# Patient Record
Sex: MALE | Race: WHITE | NOT HISPANIC OR LATINO | URBAN - METROPOLITAN AREA
[De-identification: names, ages, dates, MRNs, and addresses within clinical notes are randomized per-mention and may not be internally consistent; named-entity substitution may affect disease eponyms.]

---

## 2022-06-17 ENCOUNTER — INPATIENT (INPATIENT)
Facility: HOSPITAL | Age: 52
LOS: 2 days | Discharge: HOME | DRG: 247 | End: 2022-06-20
Attending: INTERNAL MEDICINE | Admitting: INTERNAL MEDICINE
Payer: COMMERCIAL

## 2022-06-17 ENCOUNTER — EMERGENCY (EMERGENCY)
Facility: HOSPITAL | Age: 52
LOS: 1 days | End: 2022-06-17
Attending: EMERGENCY MEDICINE
Payer: COMMERCIAL

## 2022-06-17 VITALS
DIASTOLIC BLOOD PRESSURE: 79 MMHG | OXYGEN SATURATION: 100 % | SYSTOLIC BLOOD PRESSURE: 121 MMHG | TEMPERATURE: 98 F | RESPIRATION RATE: 25 BRPM | HEART RATE: 74 BPM

## 2022-06-17 VITALS
SYSTOLIC BLOOD PRESSURE: 102 MMHG | WEIGHT: 184.09 LBS | TEMPERATURE: 98 F | DIASTOLIC BLOOD PRESSURE: 62 MMHG | RESPIRATION RATE: 17 BRPM | OXYGEN SATURATION: 96 % | HEART RATE: 72 BPM | HEIGHT: 68 IN

## 2022-06-17 VITALS
RESPIRATION RATE: 18 BRPM | OXYGEN SATURATION: 97 % | HEIGHT: 68 IN | HEART RATE: 87 BPM | WEIGHT: 190.04 LBS | SYSTOLIC BLOOD PRESSURE: 153 MMHG | DIASTOLIC BLOOD PRESSURE: 78 MMHG

## 2022-06-17 DIAGNOSIS — Z98.890 OTHER SPECIFIED POSTPROCEDURAL STATES: Chronic | ICD-10-CM

## 2022-06-17 DIAGNOSIS — I21.3 ST ELEVATION (STEMI) MYOCARDIAL INFARCTION OF UNSPECIFIED SITE: ICD-10-CM

## 2022-06-17 LAB
ALBUMIN SERPL ELPH-MCNC: 3.9 G/DL — SIGNIFICANT CHANGE UP (ref 3.3–5)
ALP SERPL-CCNC: 59 U/L — SIGNIFICANT CHANGE UP (ref 40–120)
ALT FLD-CCNC: 32 U/L — SIGNIFICANT CHANGE UP (ref 12–78)
ANION GAP SERPL CALC-SCNC: 9 MMOL/L — SIGNIFICANT CHANGE UP (ref 5–17)
APTT BLD: 23.6 SEC — LOW (ref 27.5–35.5)
AST SERPL-CCNC: 23 U/L — SIGNIFICANT CHANGE UP (ref 15–37)
BASOPHILS # BLD AUTO: 0.08 K/UL — SIGNIFICANT CHANGE UP (ref 0–0.2)
BASOPHILS NFR BLD AUTO: 1.2 % — SIGNIFICANT CHANGE UP (ref 0–2)
BILIRUB SERPL-MCNC: 0.4 MG/DL — SIGNIFICANT CHANGE UP (ref 0.2–1.2)
BUN SERPL-MCNC: 13 MG/DL — SIGNIFICANT CHANGE UP (ref 7–23)
CALCIUM SERPL-MCNC: 9.3 MG/DL — SIGNIFICANT CHANGE UP (ref 8.5–10.1)
CHLORIDE SERPL-SCNC: 106 MMOL/L — SIGNIFICANT CHANGE UP (ref 96–108)
CK MB BLD-MCNC: 6 % — HIGH (ref 0–3.5)
CK MB CFR SERPL CALC: 130.2 NG/ML — HIGH (ref 0–6.7)
CK SERPL-CCNC: 2174 U/L — HIGH (ref 30–200)
CO2 SERPL-SCNC: 25 MMOL/L — SIGNIFICANT CHANGE UP (ref 22–31)
CREAT SERPL-MCNC: 1.3 MG/DL — SIGNIFICANT CHANGE UP (ref 0.5–1.3)
EGFR: 65 ML/MIN/1.73M2 — SIGNIFICANT CHANGE UP
EOSINOPHIL # BLD AUTO: 0.22 K/UL — SIGNIFICANT CHANGE UP (ref 0–0.5)
EOSINOPHIL NFR BLD AUTO: 3.3 % — SIGNIFICANT CHANGE UP (ref 0–6)
GLUCOSE BLDC GLUCOMTR-MCNC: 130 MG/DL — HIGH (ref 70–99)
GLUCOSE BLDC GLUCOMTR-MCNC: 151 MG/DL — HIGH (ref 70–99)
GLUCOSE BLDC GLUCOMTR-MCNC: 223 MG/DL — HIGH (ref 70–99)
GLUCOSE SERPL-MCNC: 227 MG/DL — HIGH (ref 70–99)
HCT VFR BLD CALC: 40.7 % — SIGNIFICANT CHANGE UP (ref 39–50)
HGB BLD-MCNC: 13.8 G/DL — SIGNIFICANT CHANGE UP (ref 13–17)
IMM GRANULOCYTES NFR BLD AUTO: 0.3 % — SIGNIFICANT CHANGE UP (ref 0–1.5)
LYMPHOCYTES # BLD AUTO: 2.39 K/UL — SIGNIFICANT CHANGE UP (ref 1–3.3)
LYMPHOCYTES # BLD AUTO: 35.6 % — SIGNIFICANT CHANGE UP (ref 13–44)
MCHC RBC-ENTMCNC: 28 PG — SIGNIFICANT CHANGE UP (ref 27–34)
MCHC RBC-ENTMCNC: 33.9 GM/DL — SIGNIFICANT CHANGE UP (ref 32–36)
MCV RBC AUTO: 82.7 FL — SIGNIFICANT CHANGE UP (ref 80–100)
MONOCYTES # BLD AUTO: 0.75 K/UL — SIGNIFICANT CHANGE UP (ref 0–0.9)
MONOCYTES NFR BLD AUTO: 11.2 % — SIGNIFICANT CHANGE UP (ref 2–14)
NEUTROPHILS # BLD AUTO: 3.26 K/UL — SIGNIFICANT CHANGE UP (ref 1.8–7.4)
NEUTROPHILS NFR BLD AUTO: 48.4 % — SIGNIFICANT CHANGE UP (ref 43–77)
NRBC # BLD: 0 /100 WBCS — SIGNIFICANT CHANGE UP (ref 0–0)
NT-PROBNP SERPL-SCNC: 22 PG/ML — SIGNIFICANT CHANGE UP (ref 0–125)
PLATELET # BLD AUTO: 262 K/UL — SIGNIFICANT CHANGE UP (ref 150–400)
POTASSIUM SERPL-MCNC: 4 MMOL/L — SIGNIFICANT CHANGE UP (ref 3.5–5.3)
POTASSIUM SERPL-SCNC: 4 MMOL/L — SIGNIFICANT CHANGE UP (ref 3.5–5.3)
PROT SERPL-MCNC: 7.2 G/DL — SIGNIFICANT CHANGE UP (ref 6–8.3)
RBC # BLD: 4.92 M/UL — SIGNIFICANT CHANGE UP (ref 4.2–5.8)
RBC # FLD: 12.4 % — SIGNIFICANT CHANGE UP (ref 10.3–14.5)
SARS-COV-2 RNA SPEC QL NAA+PROBE: SIGNIFICANT CHANGE UP
SODIUM SERPL-SCNC: 140 MMOL/L — SIGNIFICANT CHANGE UP (ref 135–145)
TROPONIN I, HIGH SENSITIVITY RESULT: 12.6 NG/L — SIGNIFICANT CHANGE UP
TROPONIN T, HIGH SENSITIVITY RESULT: 2179 NG/L — HIGH (ref 0–51)
WBC # BLD: 6.72 K/UL — SIGNIFICANT CHANGE UP (ref 3.8–10.5)
WBC # FLD AUTO: 6.72 K/UL — SIGNIFICANT CHANGE UP (ref 3.8–10.5)

## 2022-06-17 PROCEDURE — 93010 ELECTROCARDIOGRAM REPORT: CPT | Mod: 76

## 2022-06-17 PROCEDURE — 85025 COMPLETE CBC W/AUTO DIFF WBC: CPT

## 2022-06-17 PROCEDURE — 99285 EMERGENCY DEPT VISIT HI MDM: CPT | Mod: 25

## 2022-06-17 PROCEDURE — 85730 THROMBOPLASTIN TIME PARTIAL: CPT

## 2022-06-17 PROCEDURE — 93005 ELECTROCARDIOGRAM TRACING: CPT

## 2022-06-17 PROCEDURE — 96375 TX/PRO/DX INJ NEW DRUG ADDON: CPT

## 2022-06-17 PROCEDURE — 99291 CRITICAL CARE FIRST HOUR: CPT

## 2022-06-17 PROCEDURE — 36415 COLL VENOUS BLD VENIPUNCTURE: CPT

## 2022-06-17 PROCEDURE — 99223 1ST HOSP IP/OBS HIGH 75: CPT

## 2022-06-17 PROCEDURE — 93010 ELECTROCARDIOGRAM REPORT: CPT

## 2022-06-17 PROCEDURE — 99292 CRITICAL CARE ADDL 30 MIN: CPT

## 2022-06-17 PROCEDURE — 83880 ASSAY OF NATRIURETIC PEPTIDE: CPT

## 2022-06-17 PROCEDURE — 82962 GLUCOSE BLOOD TEST: CPT

## 2022-06-17 PROCEDURE — U0003: CPT

## 2022-06-17 PROCEDURE — U0005: CPT

## 2022-06-17 PROCEDURE — 96374 THER/PROPH/DIAG INJ IV PUSH: CPT

## 2022-06-17 PROCEDURE — 84484 ASSAY OF TROPONIN QUANT: CPT

## 2022-06-17 PROCEDURE — 80053 COMPREHEN METABOLIC PANEL: CPT

## 2022-06-17 RX ORDER — ONDANSETRON 8 MG/1
4 TABLET, FILM COATED ORAL ONCE
Refills: 0 | Status: COMPLETED | OUTPATIENT
Start: 2022-06-17 | End: 2022-06-17

## 2022-06-17 RX ORDER — SODIUM CHLORIDE 9 MG/ML
1000 INJECTION, SOLUTION INTRAVENOUS
Refills: 0 | Status: DISCONTINUED | OUTPATIENT
Start: 2022-06-17 | End: 2022-06-19

## 2022-06-17 RX ORDER — DEXTROSE 50 % IN WATER 50 %
25 SYRINGE (ML) INTRAVENOUS ONCE
Refills: 0 | Status: DISCONTINUED | OUTPATIENT
Start: 2022-06-17 | End: 2022-06-19

## 2022-06-17 RX ORDER — DEXTROSE 50 % IN WATER 50 %
15 SYRINGE (ML) INTRAVENOUS ONCE
Refills: 0 | Status: DISCONTINUED | OUTPATIENT
Start: 2022-06-17 | End: 2022-06-19

## 2022-06-17 RX ORDER — HEPARIN SODIUM 5000 [USP'U]/ML
INJECTION INTRAVENOUS; SUBCUTANEOUS
Qty: 25000 | Refills: 0 | Status: DISCONTINUED | OUTPATIENT
Start: 2022-06-17 | End: 2022-06-21

## 2022-06-17 RX ORDER — GLUCAGON INJECTION, SOLUTION 0.5 MG/.1ML
1 INJECTION, SOLUTION SUBCUTANEOUS ONCE
Refills: 0 | Status: DISCONTINUED | OUTPATIENT
Start: 2022-06-17 | End: 2022-06-19

## 2022-06-17 RX ORDER — INSULIN LISPRO 100/ML
6 VIAL (ML) SUBCUTANEOUS
Refills: 0 | Status: DISCONTINUED | OUTPATIENT
Start: 2022-06-17 | End: 2022-06-18

## 2022-06-17 RX ORDER — MORPHINE SULFATE 50 MG/1
2 CAPSULE, EXTENDED RELEASE ORAL ONCE
Refills: 0 | Status: DISCONTINUED | OUTPATIENT
Start: 2022-06-17 | End: 2022-06-17

## 2022-06-17 RX ORDER — CLOPIDOGREL BISULFATE 75 MG/1
600 TABLET, FILM COATED ORAL ONCE
Refills: 0 | Status: COMPLETED | OUTPATIENT
Start: 2022-06-17 | End: 2022-06-17

## 2022-06-17 RX ORDER — INSULIN NPH HUM/REG INSULIN HM 70-30/ML
20 VIAL (ML) SUBCUTANEOUS
Qty: 0 | Refills: 0 | DISCHARGE

## 2022-06-17 RX ORDER — HUMAN INSULIN 100 [IU]/ML
20 INJECTION, SUSPENSION SUBCUTANEOUS AT BEDTIME
Refills: 0 | Status: DISCONTINUED | OUTPATIENT
Start: 2022-06-17 | End: 2022-06-19

## 2022-06-17 RX ORDER — DEXTROSE 50 % IN WATER 50 %
12.5 SYRINGE (ML) INTRAVENOUS ONCE
Refills: 0 | Status: DISCONTINUED | OUTPATIENT
Start: 2022-06-17 | End: 2022-06-19

## 2022-06-17 RX ORDER — INSULIN LISPRO 100/ML
VIAL (ML) SUBCUTANEOUS AT BEDTIME
Refills: 0 | Status: DISCONTINUED | OUTPATIENT
Start: 2022-06-17 | End: 2022-06-20

## 2022-06-17 RX ORDER — INSULIN LISPRO 100/ML
VIAL (ML) SUBCUTANEOUS
Refills: 0 | Status: DISCONTINUED | OUTPATIENT
Start: 2022-06-17 | End: 2022-06-20

## 2022-06-17 RX ORDER — TICAGRELOR 90 MG/1
180 TABLET ORAL ONCE
Refills: 0 | Status: COMPLETED | OUTPATIENT
Start: 2022-06-17 | End: 2022-06-17

## 2022-06-17 RX ORDER — HUMAN INSULIN 100 [IU]/ML
27 INJECTION, SUSPENSION SUBCUTANEOUS
Refills: 0 | Status: DISCONTINUED | OUTPATIENT
Start: 2022-06-18 | End: 2022-06-19

## 2022-06-17 RX ORDER — HEPARIN SODIUM 5000 [USP'U]/ML
5000 INJECTION INTRAVENOUS; SUBCUTANEOUS EVERY 8 HOURS
Refills: 0 | Status: DISCONTINUED | OUTPATIENT
Start: 2022-06-18 | End: 2022-06-20

## 2022-06-17 RX ORDER — ASPIRIN/CALCIUM CARB/MAGNESIUM 324 MG
81 TABLET ORAL DAILY
Refills: 0 | Status: DISCONTINUED | OUTPATIENT
Start: 2022-06-18 | End: 2022-06-20

## 2022-06-17 RX ORDER — INSULIN NPH HUM/REG INSULIN HM 70-30/ML
27 VIAL (ML) SUBCUTANEOUS
Qty: 0 | Refills: 0 | DISCHARGE

## 2022-06-17 RX ORDER — ATORVASTATIN CALCIUM 80 MG/1
80 TABLET, FILM COATED ORAL AT BEDTIME
Refills: 0 | Status: DISCONTINUED | OUTPATIENT
Start: 2022-06-17 | End: 2022-06-20

## 2022-06-17 RX ORDER — TICAGRELOR 90 MG/1
90 TABLET ORAL EVERY 12 HOURS
Refills: 0 | Status: DISCONTINUED | OUTPATIENT
Start: 2022-06-18 | End: 2022-06-20

## 2022-06-17 RX ORDER — ASPIRIN/CALCIUM CARB/MAGNESIUM 324 MG
1 TABLET ORAL
Qty: 0 | Refills: 0 | DISCHARGE

## 2022-06-17 RX ORDER — INSULIN LISPRO 100/ML
8 VIAL (ML) SUBCUTANEOUS
Qty: 0 | Refills: 0 | DISCHARGE

## 2022-06-17 RX ORDER — HEPARIN SODIUM 5000 [USP'U]/ML
4000 INJECTION INTRAVENOUS; SUBCUTANEOUS ONCE
Refills: 0 | Status: COMPLETED | OUTPATIENT
Start: 2022-06-17 | End: 2022-06-17

## 2022-06-17 RX ORDER — INFLUENZA VIRUS VACCINE 15; 15; 15; 15 UG/.5ML; UG/.5ML; UG/.5ML; UG/.5ML
0.5 SUSPENSION INTRAMUSCULAR ONCE
Refills: 0 | Status: COMPLETED | OUTPATIENT
Start: 2022-06-17 | End: 2022-06-17

## 2022-06-17 RX ORDER — HEPARIN SODIUM 5000 [USP'U]/ML
4000 INJECTION INTRAVENOUS; SUBCUTANEOUS EVERY 6 HOURS
Refills: 0 | Status: DISCONTINUED | OUTPATIENT
Start: 2022-06-17 | End: 2022-06-21

## 2022-06-17 RX ORDER — CHLORHEXIDINE GLUCONATE 213 G/1000ML
1 SOLUTION TOPICAL
Refills: 0 | Status: DISCONTINUED | OUTPATIENT
Start: 2022-06-17 | End: 2022-06-20

## 2022-06-17 RX ORDER — INSULIN LISPRO 100/ML
VIAL (ML) SUBCUTANEOUS
Refills: 0 | Status: DISCONTINUED | OUTPATIENT
Start: 2022-06-17 | End: 2022-06-17

## 2022-06-17 RX ADMIN — CLOPIDOGREL BISULFATE 600 MILLIGRAM(S): 75 TABLET, FILM COATED ORAL at 14:37

## 2022-06-17 RX ADMIN — ATORVASTATIN CALCIUM 80 MILLIGRAM(S): 80 TABLET, FILM COATED ORAL at 22:03

## 2022-06-17 RX ADMIN — Medication 2: at 20:21

## 2022-06-17 RX ADMIN — HUMAN INSULIN 20 UNIT(S): 100 INJECTION, SUSPENSION SUBCUTANEOUS at 22:03

## 2022-06-17 RX ADMIN — MORPHINE SULFATE 2 MILLIGRAM(S): 50 CAPSULE, EXTENDED RELEASE ORAL at 14:20

## 2022-06-17 RX ADMIN — CHLORHEXIDINE GLUCONATE 1 APPLICATION(S): 213 SOLUTION TOPICAL at 22:37

## 2022-06-17 RX ADMIN — ONDANSETRON 4 MILLIGRAM(S): 8 TABLET, FILM COATED ORAL at 18:15

## 2022-06-17 RX ADMIN — MORPHINE SULFATE 2 MILLIGRAM(S): 50 CAPSULE, EXTENDED RELEASE ORAL at 14:44

## 2022-06-17 RX ADMIN — TICAGRELOR 180 MILLIGRAM(S): 90 TABLET ORAL at 15:10

## 2022-06-17 RX ADMIN — HEPARIN SODIUM 1000 UNIT(S)/HR: 5000 INJECTION INTRAVENOUS; SUBCUTANEOUS at 14:45

## 2022-06-17 RX ADMIN — Medication 6 UNIT(S): at 20:20

## 2022-06-17 RX ADMIN — HEPARIN SODIUM 4000 UNIT(S): 5000 INJECTION INTRAVENOUS; SUBCUTANEOUS at 14:30

## 2022-06-17 RX ADMIN — ONDANSETRON 4 MILLIGRAM(S): 8 TABLET, FILM COATED ORAL at 14:45

## 2022-06-17 NOTE — H&P ADULT - HISTORY OF PRESENT ILLNESS
52 year old man with diabetes, family history of CAD and CHF, presents on 6/17 from work with severe chest pain in the center of his chest, associated with nausea. 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 Patient admits to heavier than usual drinking last night, does not drink daily, according to wife. He was a previous smoker.    EMS found patient with a junctional bradycardia, HR in the 30s, atropine given.  given. Patient brought to [facility].    In [place] ED, patient EKG significant for ST elevations in III, AVF, lead II slight elevation; reciprocal changes in V4-V6, HR normalized to 70s.  Patient given 325 aspirin and 600 Plavix. Patient subsequently received Brilinta 180 as well as heparin bolus. Patient transferred ot [facility] for PCI.    At [facility], patient received 2 stents in RCA. During procedure, patient with ectopic AVIR, received lidocaine 50x2. EF 50% noted during procedure.      52 year old man with type 1 DM on Humulin BID/Humalog pre-meal, HLD on simvastatin, presented to OSH on 6/17 with severe chest pain in the center of his chest, associated with nausea. 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 Patient admits to heavier than usual drinking last night, does not drink daily, according to wife. He was a previous smoker, quit 6 months ago, smoked for >20 years prior.     EMS found patient with a junctional bradycardia, HR in the 30s, atropine given.  given. Patient brought to [facility].    In [place] ED, patient EKG significant for ST elevations in III, AVF, lead II slight elevation; reciprocal changes in V4-V6, HR normalized to 70s.  Patient given 325 aspirin and 600 Plavix. Patient subsequently received Brilinta 180 as well as heparin bolus. Patient transferred ot [facility] for PCI.    At [facility], patient received 2 stents in RCA for 100% stenosis. During procedure, patient with ectopic AVIR, received lidocaine 50x2. EF 50% noted during procedure.

## 2022-06-17 NOTE — PROGRESS NOTE ADULT - SUBJECTIVE AND OBJECTIVE BOX
Admission date:  CHIEF COMPLAINT:  HPI:  52 year old man with type 1 DM on Humulin BID/Humalog pre-meal, HLD on simvastatin, presented to OSH on  with severe chest pain in the center of his chest, associated with nausea. 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 Patient admits to heavier than usual drinking last night, does not drink daily, according to wife. He was a previous smoker, quit 6 months ago, smoked for >20 years prior.     EMS found patient with a junctional bradycardia, HR in the 30s, atropine given.  given. Patient brought to [facility].    In [place] ED, patient EKG significant for ST elevations in III, AVF, lead II slight elevation; reciprocal changes in V4-V6, HR normalized to 70s.  Patient given 325 aspirin and 600 Plavix. Patient subsequently received Brilinta 180 as well as heparin bolus. Patient transferred ot [facility] for PCI.    At [facility], patient received 2 stents in RCA for 100% stenosis. During procedure, patient with ectopic AVIR, received lidocaine 50x2. EF 50% noted during procedure.      (2022 16:21)    INTERVAL HISTORY:    REVIEW OF SYSTEMS: Denies xxxxxx; all others negative    MEDICATIONS  (STANDING):  atorvastatin 80 milliGRAM(s) Oral at bedtime  chlorhexidine 4% Liquid 1 Application(s) Topical <User Schedule>  dextrose 5%. 1000 milliLiter(s) (50 mL/Hr) IV Continuous <Continuous>  dextrose 5%. 1000 milliLiter(s) (100 mL/Hr) IV Continuous <Continuous>  dextrose 50% Injectable 25 Gram(s) IV Push once  dextrose 50% Injectable 12.5 Gram(s) IV Push once  dextrose 50% Injectable 25 Gram(s) IV Push once  glucagon  Injectable 1 milliGRAM(s) IntraMuscular once  influenza   Vaccine 0.5 milliLiter(s) IntraMuscular once  insulin lispro (ADMELOG) corrective regimen sliding scale   SubCutaneous at bedtime  insulin lispro (ADMELOG) corrective regimen sliding scale   SubCutaneous three times a day before meals  insulin lispro Injectable (ADMELOG) 6 Unit(s) SubCutaneous three times a day before meals  insulin NPH human recombinant 20 Unit(s) SubCutaneous at bedtime    MEDICATIONS  (PRN):  dextrose Oral Gel 15 Gram(s) Oral once PRN Blood Glucose LESS THAN 70 milliGRAM(s)/deciliter      Objective:  ICU Vital Signs Last 24 Hrs  T(C): 36.7 (2022 20:00), Max: 36.8 (2022 14:45)  T(F): 98 (2022 20:00), Max: 98.2 (2022 14:45)  HR: 62 (2022 21:00) (58 - 87)  BP: 109/60 (2022 21:00) (93/56 - 153/78)  BP(mean): 79 (2022 21:00) (68 - 94)  ABP: --  ABP(mean): --  RR: 14 (2022 21:00) (10 - 38)  SpO2: 95% (2022 21:00) (94% - 100%)      Adult Advanced Hemodynamics Last 24 Hrs  CVP(mm Hg): --  CVP(cm H2O): --  CO: --  CI: --  PA: --  PA(mean): --  PCWP: --  SVR: --  SVRI: --  PVR: --  PVRI: --       @ 07:01  -   @ 21:12  --------------------------------------------------------  IN: 0 mL / OUT: 100 mL / NET: -100 mL      Daily Height in cm: 172.72 (2022 16:27)    Daily Weight in k.5 (2022 16:27)    PHYSICAL EXAM:      Constitutional:    HEENT:    Respiratory:    Cardiovascular:  Access site:    Gastrointestinal:    Genitourinary:    Extremities:    Vascular:    Neurological:    Skin:      TELEMETRY:     EKG:     IMAGING:    Labs:                          13.8   6.72  )-----------( 262      ( 2022 14:36 )             40.7     06-17    140  |  106  |  13  ----------------------------<  227<H>  4.0   |  25  |  1.30    Ca    9.3      2022 14:36    TPro  7.2  /  Alb  3.9  /  TBili  0.4  /  DBili  x   /  AST  23  /  ALT  32  /  AlkPhos  59  06-17    LIVER FUNCTIONS - ( 2022 14:36 )  Alb: 3.9 g/dL / Pro: 7.2 g/dL / ALK PHOS: 59 U/L / ALT: 32 U/L / AST: 23 U/L / GGT: x           PTT - ( 2022 14:37 )  PTT:23.6 sec        HEALTH ISSUES - PROBLEM Dx:            I have personally provided ______ minutes of critical care time excluding time spent on separate procedures, in addition to initial critical care time provided by the CICU Attending, _____.    HPI:  52 year old man with type 1 DM on Humulin BID/Humalog pre-meal, HLD on simvastatin, presented to OSH on  with severe chest pain in the center of his chest, associated with nausea. 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 Patient admits to heavier than usual drinking last night, does not drink daily, according to wife. He was a previous smoker, quit 6 months ago, smoked for >20 years prior.     EMS found patient with a junctional bradycardia, HR in the 30s, atropine given.  given. Patient brought to [facility].    In [place] ED, patient EKG significant for ST elevations in III, AVF, lead II slight elevation; reciprocal changes in V4-V6, HR normalized to 70s.  Patient given 325 aspirin and 600 Plavix. Patient subsequently received Brilinta 180 as well as heparin bolus. Patient transferred ot [facility] for PCI.    At [facility], patient received 2 stents in RCA for 100% stenosis. During procedure, patient with ectopic AVIR, received lidocaine 50x2. EF 50% noted during procedure.      (2022 16:21)    INTERVAL HISTORY:  - IWSTEMI s/p C % s/p EVERETT x2    MEDICATIONS  (STANDING):  atorvastatin 80 milliGRAM(s) Oral at bedtime  chlorhexidine 4% Liquid 1 Application(s) Topical <User Schedule>  dextrose 5%. 1000 milliLiter(s) (50 mL/Hr) IV Continuous <Continuous>  dextrose 5%. 1000 milliLiter(s) (100 mL/Hr) IV Continuous <Continuous>  dextrose 50% Injectable 25 Gram(s) IV Push once  dextrose 50% Injectable 12.5 Gram(s) IV Push once  dextrose 50% Injectable 25 Gram(s) IV Push once  glucagon  Injectable 1 milliGRAM(s) IntraMuscular once  influenza   Vaccine 0.5 milliLiter(s) IntraMuscular once  insulin lispro (ADMELOG) corrective regimen sliding scale   SubCutaneous at bedtime  insulin lispro (ADMELOG) corrective regimen sliding scale   SubCutaneous three times a day before meals  insulin lispro Injectable (ADMELOG) 6 Unit(s) SubCutaneous three times a day before meals  insulin NPH human recombinant 20 Unit(s) SubCutaneous at bedtime    MEDICATIONS  (PRN):  dextrose Oral Gel 15 Gram(s) Oral once PRN Blood Glucose LESS THAN 70 milliGRAM(s)/deciliter      Objective:  ICU Vital Signs Last 24 Hrs  T(C): 36.7 (2022 20:00), Max: 36.8 (2022 14:45)  T(F): 98 (2022 20:00), Max: 98.2 (2022 14:45)  HR: 62 (2022 21:00) (58 - 87)  BP: 109/60 (2022 21:00) (93/56 - 153/78)  BP(mean): 79 (2022 21:00) (68 - 94)  RR: 14 (2022 21:00) (10 - 38)  SpO2: 95% (2022 21:00) (94% - 100%)       @ 07:01  -  17 @ 21:12  --------------------------------------------------------  IN: 0 mL / OUT: 100 mL / NET: -100 mL      Daily Height in cm: 172.72 (2022 16:27)    Daily Weight in k.5 (2022 16:27)    PHYSICAL EXAM:  GENERAL: No acute distress  HEAD:  Atraumatic, Normocephalic  EYES: EOMI, conjunctiva and sclera clear  NECK: no JVD  CHEST/LUNG: CTAB; No wheezes, rales, or rhonchi  HEART: Regular rate and rhythm; No murmurs, rubs, or gallops  ABDOMEN: Soft, non-tender, non-distended; normal bowel sounds  EXTREMITIES: Right radial band in place; 2+ peripheral pulses b/l, No clubbing, cyanosis, or edema  NEUROLOGY: A&O x 3, no focal deficits      LABS:             13.8   6.72  )-----------( 262      ( 2022 14:36 )             40.7         140  |  106  |  13  ----------------------------<  227<H>  4.0   |  25  |  1.30    Ca    9.3      2022 14:36    TPro  7.2  /  Alb  3.9  /  TBili  0.4  /  DBili  x   /  AST  23  /  ALT  32  /  AlkPhos  59  06-17    LIVER FUNCTIONS - ( 2022 14:36 )  Alb: 3.9 g/dL / Pro: 7.2 g/dL / ALK PHOS: 59 U/L / ALT: 32 U/L / AST: 23 U/L / GGT: x           PTT - ( 2022 14:37 )  PTT:23.6 sec      ASSESSMENT & PLAN:  52 year old man with diabetes, family history of CAD and CHF, presented with inferior wall STEMI, s/p 2 EVERETT to RCA, transferred to CCU for further management.     Neuro:  AOx3, no active issues  - neuro checks per protocol    Pulmonary:  No active issues  - Saturating well on room air    CV:  #IWSTEMI  - s/p LHC with EVERETT x2 to 100% RCA. Some intraop AIVR with hypotension req. lidocaine  - cont DAPT w/ aspirin and Brilinta  - c/w high intensity statin  - f/u TTE  - trend CE to peak  - f/u lipid panel, A1c, TSH    Renal:  No active issues  - Trend BUN/SCr  - trend BMP daily, monitor Strict Is&Os  - Keep K > 4, Mg > 2    GI:  No active issues  - DASH/TLC/Consistent carb diet     Endocrine:  #Diabetes, type 1  - f/u A1c  - cont Humulin 27AM/20PM, 6 Humalog pre-meal (home dose 8U) with sliding scale  - trend finger sticks  - consistent carb diet    ID:  No active issues  - trend CBC and fever curve, monitor off abx    Heme:  - heparin subQ for DVT ppx   - Trend CBC          I have personally provided 35 minutes of critical care time excluding time spent on separate procedures, in addition to initial critical care time provided by the CICU Attending, Dr. Card.

## 2022-06-17 NOTE — H&P ADULT - ATTENDING COMMENTS
53 yo man with DM1, HLD, presented with inf wall MI to the OSH s/p PCI with EVERETT to the RCA, intraprocedural AIVR requiring lidocaine some hypotension    Continue DAPT  High intensity statin   TTE  check lipids, TSH, HbA1C  given type 1 DM will continue most of the home regimen with some adjustment to the pre meals  DVT with SQH, H/H WNL  trend CE  OMT as tolerated given hypotension intra and post procedure

## 2022-06-17 NOTE — CONSULT NOTE ADULT - SUBJECTIVE AND OBJECTIVE BOX
[facility] - Yulisa Ryan, Albaro, Estevan, Dionisio, Roberto, Shilpa Griffiths  Office Number: [phone]    Initial Consult Note  chest pain with ST elevations in III,AVF, reciprocal changes, ST depressions  V4-V6  CHIEF COMPLAINT: Patient is a 52y old  Male who presents with a chief complaint chest pain    HPI:  52M with PMH of diabetes, +family history of CAD and CHF, presents from work with severe chest pain in the center of his chest, associated with nausea. Patient developed crushing chest pain at work and EMS called. EMS found patient with a junctional bradycardia, HR in the 30s, atropine given.  given. Patient brought to [facility]. In ER, patient EKG consistent with Inferior lead ST elevations, III, AVF, lead II slight elevation. Reciprocal changes in V4-V6. HR is now normal, 70s on EKG.  given, plavix 600 given. STEMI alert, patient urgently being transferred to [facility]. Dr. Clarke at [facility] requested brilinta 180, brilinta given. Heparin bolus given. Patient's wife is at bedside. Patient is in pain and is moaning, does not want to answer questions at this time. 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 Wife reports +family history of CAD, patient's mother and brother with CAD and HF. Patient admits to heavier than usual drinking last night, does not drink daily, according to wife. He was a previous smoker.  In ER patient received morphine 2mg IVP x 2 doses.  Patient reports the pain is not improved, and he feels nauseated. Zofran given. BP is stable at 130/70s    PAST MEDICAL & SURGICAL HISTORY:  Diabetes        SOCIAL HISTORY:  No tobacco, ethanol, or drug abuse.  FAMILY HISTORY:    No family history of acute MI or sudden cardiac death.  MEDICATIONS  (STANDING):  heparin  Infusion.  Unit(s)/Hr (10 mL/Hr) IV Continuous <Continuous>  morphine  - Injectable 2 milliGRAM(s) IV Push Once  ondansetron Injectable 4 milliGRAM(s) IV Push once  ondansetron Injectable 4 milliGRAM(s) IV Push once  ticagrelor 180 milliGRAM(s) Oral Once    MEDICATIONS  (PRN):  heparin   Injectable 4000 Unit(s) IV Push every 6 hours PRN For aPTT less than 40    Allergies    No Known Allergies    Intolerances      REVIEW OF SYSTEMS:  CONSTITUTIONAL: very distressed, anxious, restless, moaning in pain  EYES/ENT: No visual changes;  No vertigo or throat pain   NECK: No pain or stiffness  RESPIRATORY: No cough, wheezing, hemoptysis; No shortness of breath  CARDIOVASCULAR: endorses chest pain  GASTROINTESTINAL: Nausea +abdominal pain  GENITOURINARY: No dysuria, frequency or hematuria  NEUROLOGICAL: No numbness or weakness  SKIN: No itching or rash  All other review of systems is negative unless indicated above  VITAL SIGNS:   Vital Signs Last 24 Hrs  T(C): --  T(F): --  HR: 79 (17 Jun 2022 14:29) (79 - 87)  BP: 131/76 (17 Jun 2022 14:29) (131/76 - 153/78)  BP(mean): --  RR: 35 (17 Jun 2022 14:29) (18 - 35)  SpO2: 100% (17 Jun 2022 14:29) (97% - 100%)  I&O's Summary    On Exam:  Constitutional: NAD, alert and oriented x 3  Lungs:  Non-labored, breath sounds are clear bilaterally, No wheezing, rales or rhonchi  Cardiovascular: RRR.  S1 and S2 positive.  No murmurs, rubs, gallops or clicks  Gastrointestinal: Bowel Sounds present, soft, nontender.   Lymph: No peripheral edema. No cervical lymphadenopathy.  Neurological: Alert, no focal deficits  Skin: No rashes or ulcers   Psych:  Mood & affect appropriate.    LABS: All Labs Reviewed:                        13.8   6.72  )-----------( 262      ( 17 Jun 2022 14:36 )             40.7

## 2022-06-17 NOTE — H&P ADULT - NSHPLABSRESULTS_GEN_ALL_CORE
13.8   6.72  )-----------( 262      ( 17 Jun 2022 14:36 )             40.7   06-17      140  |  106  |  13  ----------------------------<  227<H>  4.0   |  25  |  1.30    Ca    9.3      17 Jun 2022 14:36    TPro  7.2  /  Alb  3.9  /  TBili  0.4  /  DBili  x   /  AST  23  /  ALT  32  /  AlkPhos  59  06-17      Serum Pro-Brain Natriuretic Peptide: 22 pg/mL (06.17.22 @ 14:36) Troponin I, High Sensitivity Result: 12.6: Serial measurements of high sensitivity troponin I (hs TnI) showing rapid

## 2022-06-17 NOTE — H&P ADULT - NSICDXFAMILYHX_GEN_ALL_CORE_FT
FAMILY HISTORY:  FH: coronary artery disease, Mother and Brother  FH: heart failure, Mother and Brother     FAMILY HISTORY:  FH: coronary artery disease, Mother  FH: heart failure, Mother

## 2022-06-17 NOTE — ED ADULT TRIAGE NOTE - CHIEF COMPLAINT QUOTE
Pt BIB EMS c/c of chest pain. Per ems pt found to be bradycardic to 30s on their arrival in "junctional rhythm" given 325 ASA and atropine, improved bradycardia chest pain continued.

## 2022-06-17 NOTE — CHART NOTE - NSCHARTNOTEFT_GEN_A_CORE
Removal of Radial Band    Pulses in the right upper extremity are palpable. The patient was placed in the supine position. The insertion site was identified and the band deflated per protocol. The radial band was removed slowly. Direct pressure was applied for -- not applicable.      Monitoring of the right wrists and both upper extremities including neuro-vascular checks and vital signs every 15 minutes x 4.    Complications: None.    Comments: No bleeding or hematoma formation, Palpable radial pulse. Warm well perfused. Dressing applied.

## 2022-06-17 NOTE — H&P ADULT - ASSESSMENT
52 year old man with diabetes, family history of CAD and CHF, presented with inferior wall STEMI, s/p 2 EVERETT to RCA, transferred to CCU for further management.     Neuro:  AOx3, no active issues    Pulmonary:  Saturating well on room air    CV:  #STEMI  -c/w aspirin and Brilinta  -f/u TTE  52 year old man with diabetes, family history of CAD and CHF, presented with inferior wall STEMI, s/p 2 EVERETT to RCA, transferred to CCU for further management.     Neuro:  AOx3, no active issues    Pulmonary:  Saturating well on room air    CV:  #STEMI  -c/w aspirin and Brilinta  -f/u TTE   -f/u lipid panel, A1c, TSH     Renal:  -trend BMP daily, monitor Is&Os    GI:  DASH/TLC/Consistent carb diet     Endocrine:  #Diabetes  -f/u A1c  -home medications:      ID:  -no active issues 52 year old man with diabetes, family history of CAD and CHF, presented with inferior wall STEMI, s/p 2 EVERETT to RCA, transferred to CCU for further management.     Neuro:  AOx3, no active issues    Pulmonary:  Saturating well on room air    CV:  #STEMI  -c/w aspirin and Brilinta  -c/w high intensity statin  -f/u TTE   -f/u lipid panel, A1c, TSH     Renal:  -trend BMP daily, monitor Is&Os    GI:  DASH/TLC/Consistent carb diet     Endocrine:  #Diabetes  -f/u A1c  -home medications:      ID:  -no active issues 52 year old man with diabetes, family history of CAD and CHF, presented with inferior wall STEMI, s/p 2 EVERETT to RCA, transferred to CCU for further management.     Neuro:  AOx3, no active issues    Pulmonary:  Saturating well on room air    CV:  #STEMI  -c/w aspirin and Brilinta  -c/w high intensity statin  -f/u TTE   -f/u lipid panel, A1c, TSH     Renal:  -trend BMP daily, monitor Is&Os    GI:  DASH/TLC/Consistent carb diet     Endocrine:  #Diabetes, type 1   -f/u A1c  -home medication: Humulin 27AM/20PM, 8 Humalog pre-meal with sliding scale  -consistent carb diet       ID:  -no active issues    Heme:  -heparin subQ for DVT ppx      52 year old man with diabetes, family history of CAD and CHF, presented with inferior wall STEMI, s/p 2 EVERETT to RCA, transferred to CCU for further management.     Neuro:  AOx3, no active issues    Pulmonary:  Saturating well on room air    CV:  #STEMI  -c/w aspirin and Brilinta  -c/w high intensity statin  -f/u TTE   -f/u lipid panel, A1c, TSH     Renal:  -trend BMP daily, monitor Is&Os    GI:  DASH/TLC/Consistent carb diet     #Nausea  -s/p MI, zofran prn, monitor QTc    Endocrine:  #Diabetes, type 1   -f/u A1c  -home medication: Humulin 27AM/20PM, 8 Humalog pre-meal with sliding scale  -consistent carb diet       ID:  -no active issues    Heme:  -heparin subQ for DVT ppx

## 2022-06-17 NOTE — PATIENT PROFILE ADULT - FALL HARM RISK - RISK INTERVENTIONS
Monitor for mental status changes/Reinforce activity limits and safety measures with patient and family/Bed in lowest position, wheels locked, appropriate side rails in place/Call bell, personal items and telephone in reach/[place] to call system/Physically safe environment - no spills, clutter or unnecessary equipment/Room/bathroom lighting operational, light cord in reach

## 2022-06-17 NOTE — H&P ADULT - NSHPPHYSICALEXAM_GEN_ALL_CORE
Vital Signs Last 24 Hrs  T(C): 36.8 (17 Jun 2022 14:45), Max: 36.8 (17 Jun 2022 14:45)  T(F): 98.2 (17 Jun 2022 14:45), Max: 98.2 (17 Jun 2022 14:45)  HR: 74 (17 Jun 2022 14:45) (74 - 87)  BP: 121/79 (17 Jun 2022 14:45) (121/79 - 153/78)  BP(mean): --  RR: 25 (17 Jun 2022 14:45) (18 - 35)  SpO2: 100% (17 Jun 2022 14:45) (97% - 100%)    PHYSICAL EXAM:  GENERAL: No acute distress, well-developed  HEAD:  Atraumatic, Normocephalic  EYES: EOMI, PERRLA, conjunctiva and sclera clear  NECK: Supple, no lymphadenopathy, no JVD  CHEST/LUNG: CTAB; No wheezes, rales, or rhonchi  HEART: Regular rate and rhythm; No murmurs, rubs, or gallops  ABDOMEN: Soft, non-tender, non-distended; normal bowel sounds, no organomegaly  EXTREMITIES:  2+ peripheral pulses b/l, No clubbing, cyanosis, or edema  NEUROLOGY: A&O x 3, no focal deficits  SKIN: No rashes or lesions  PSYCH: normal behavior, normal affect, normal speech Vital Signs Last 24 Hrs  T(C): 36.8 (17 Jun 2022 14:45), Max: 36.8 (17 Jun 2022 14:45)  T(F): 98.2 (17 Jun 2022 14:45), Max: 98.2 (17 Jun 2022 14:45)  HR: 74 (17 Jun 2022 14:45) (74 - 87)  BP: 121/79 (17 Jun 2022 14:45) (121/79 - 153/78)  BP(mean): --  RR: 25 (17 Jun 2022 14:45) (18 - 35)  SpO2: 100% (17 Jun 2022 14:45) (97% - 100%)    PHYSICAL EXAM:  GENERAL: No acute distress, well-developed  HEAD:  Atraumatic, Normocephalic  EYES: EOMI, conjunctiva and sclera clear  NECK: Supple, no lymphadenopathy, no JVD  CHEST/LUNG: CTAB; No wheezes, rales, or rhonchi  HEART: Regular rate and rhythm; No murmurs, rubs, or gallops  ABDOMEN: Soft, non-tender, non-distended; normal bowel sounds, no organomegaly  EXTREMITIES:  Right radial band in place; 2+ peripheral pulses b/l, No clubbing, cyanosis, or edema  NEUROLOGY: A&O x 3, no focal deficits  SKIN: No rashes or lesions  PSYCH: normal behavior, normal affect, normal speech

## 2022-06-17 NOTE — CONSULT NOTE ADULT - ASSESSMENT
52M with PMH DM presents with IWSTEMI, being urgently transferred to [facility].    PLAN:  Urgent transfer  interventionalist Dr. Clarke and Dr. Rubalcava notified at [facility] and accepted patient  Loaded with heparin, asa, brilinta 180 and plavix 600  morphine 2mg x 2  zofran  awaiting left heart cath  Dr. Irvin at bedside

## 2022-06-17 NOTE — ED ADULT NURSE NOTE - OBJECTIVE STATEMENT
Pt BIBA c/o mid chest pain started half hr ago with nausea. As per EMS pt desirae mid 30s on the scene, atropine given, on arrival to the ED pt HR in mid 60s. Nursing care ongoing

## 2022-06-17 NOTE — H&P ADULT - NSHPSOCIALHISTORY_GEN_ALL_CORE
Smoking:  Alcohol:   Substance: Former smoker, quit 6 months ago, smoked >20 years  Occasional alcohol use, 2-3 beers a week  No illicit substance use     Lives at home wife wife, two children age 16, 18    Works in a [employer]

## 2022-06-17 NOTE — PATIENT PROFILE ADULT - VISION (WITH CORRECTIVE LENSES IF THE PATIENT USUALLY WEARS THEM):
Normal vision: sees adequately in most situations; can see medication labels, newsprint Rehabilitation services

## 2022-06-17 NOTE — H&P ADULT - NSHPREVIEWOFSYSTEMS_GEN_ALL_CORE
REVIEW OF SYSTEMS:    CONSTITUTIONAL: No weakness, fevers or chills  EYES/ENT: No visual changes;  No vertigo or throat pain   NECK: No pain or stiffness  RESPIRATORY: No cough, wheezing, hemoptysis; No shortness of breath  CARDIOVASCULAR: No chest pain or palpitations  GASTROINTESTINAL: No abdominal or epigastric pain. No nausea, vomiting, or hematemesis; No diarrhea or constipation. No melena or hematochezia.  BACK: No CVA tenderness  GENITOURINARY: No dysuria, frequency or hematuria  NEUROLOGICAL: No numbness or weakness  SKIN: No itching, rashes REVIEW OF SYSTEMS:    CONSTITUTIONAL: No weakness, fevers or chills  EYES/ENT: No visual changes;  No vertigo or throat pain   NECK: No pain or stiffness  RESPIRATORY: No cough, wheezing, hemoptysis; No shortness of breath  CARDIOVASCULAR: No chest pain or palpitations  GASTROINTESTINAL: No abdominal or epigastric pain. +nausea, no vomiting, or hematemesis; No diarrhea or constipation. No melena or hematochezia.  BACK: No CVA tenderness  GENITOURINARY: No dysuria, frequency or hematuria  NEUROLOGICAL: No numbness or weakness  SKIN: No itching, rashes

## 2022-06-17 NOTE — ED ADULT NURSE NOTE - AS [facility]
Left message for patient to call scheduling ([phone]) to schedule screening mammogram    (4) no limitation

## 2022-06-17 NOTE — CONSULT NOTE ADULT - NS PANP COMMENT GEN_ALL_CORE FT
Chart reviewed    Patient seen and examined    Agree with plan as outlined above    52M with PMH DM presents with IWSTEMI, being urgently transferred to [facility].    PLAN:  Urgent transfer  interventionalist Dr. Clarke and Dr. Rubalcava notified at [facility] and accepted patient  Loaded with heparin, asa, brilinta 180 and plavix 600  morphine 2mg x 2  zofran  awaiting left heart cath  Dr. Irvin at bedside

## 2022-06-18 LAB
A1C WITH ESTIMATED AVERAGE GLUCOSE RESULT: 7.7 % — HIGH (ref 4–5.6)
ALBUMIN SERPL ELPH-MCNC: 3.6 G/DL — SIGNIFICANT CHANGE UP (ref 3.3–5)
ALBUMIN SERPL ELPH-MCNC: 3.7 G/DL — SIGNIFICANT CHANGE UP (ref 3.3–5)
ALP SERPL-CCNC: 55 U/L — SIGNIFICANT CHANGE UP (ref 40–120)
ALP SERPL-CCNC: 55 U/L — SIGNIFICANT CHANGE UP (ref 40–120)
ALT FLD-CCNC: 41 U/L — SIGNIFICANT CHANGE UP (ref 10–45)
ALT FLD-CCNC: 43 U/L — SIGNIFICANT CHANGE UP (ref 10–45)
ANION GAP SERPL CALC-SCNC: 13 MMOL/L — SIGNIFICANT CHANGE UP (ref 5–17)
ANION GAP SERPL CALC-SCNC: 9 MMOL/L — SIGNIFICANT CHANGE UP (ref 5–17)
APTT BLD: 28.5 SEC — SIGNIFICANT CHANGE UP (ref 27.5–35.5)
AST SERPL-CCNC: 138 U/L — HIGH (ref 10–40)
AST SERPL-CCNC: 199 U/L — HIGH (ref 10–40)
BASOPHILS # BLD AUTO: 0.03 K/UL — SIGNIFICANT CHANGE UP (ref 0–0.2)
BASOPHILS # BLD AUTO: 0.06 K/UL — SIGNIFICANT CHANGE UP (ref 0–0.2)
BASOPHILS NFR BLD AUTO: 0.2 % — SIGNIFICANT CHANGE UP (ref 0–2)
BASOPHILS NFR BLD AUTO: 0.5 % — SIGNIFICANT CHANGE UP (ref 0–2)
BILIRUB SERPL-MCNC: 0.4 MG/DL — SIGNIFICANT CHANGE UP (ref 0.2–1.2)
BILIRUB SERPL-MCNC: 0.5 MG/DL — SIGNIFICANT CHANGE UP (ref 0.2–1.2)
BUN SERPL-MCNC: 14 MG/DL — SIGNIFICANT CHANGE UP (ref 7–23)
BUN SERPL-MCNC: 17 MG/DL — SIGNIFICANT CHANGE UP (ref 7–23)
CALCIUM SERPL-MCNC: 8.1 MG/DL — LOW (ref 8.4–10.5)
CALCIUM SERPL-MCNC: 8.5 MG/DL — SIGNIFICANT CHANGE UP (ref 8.4–10.5)
CHLORIDE SERPL-SCNC: 104 MMOL/L — SIGNIFICANT CHANGE UP (ref 96–108)
CHLORIDE SERPL-SCNC: 104 MMOL/L — SIGNIFICANT CHANGE UP (ref 96–108)
CHOLEST SERPL-MCNC: 109 MG/DL — SIGNIFICANT CHANGE UP
CK MB BLD-MCNC: 5.5 % — HIGH (ref 0–3.5)
CK MB CFR SERPL CALC: 99.9 NG/ML — HIGH (ref 0–6.7)
CK SERPL-CCNC: 1826 U/L — HIGH (ref 30–200)
CO2 SERPL-SCNC: 22 MMOL/L — SIGNIFICANT CHANGE UP (ref 22–31)
CO2 SERPL-SCNC: 23 MMOL/L — SIGNIFICANT CHANGE UP (ref 22–31)
CREAT SERPL-MCNC: 0.95 MG/DL — SIGNIFICANT CHANGE UP (ref 0.5–1.3)
CREAT SERPL-MCNC: 1.05 MG/DL — SIGNIFICANT CHANGE UP (ref 0.5–1.3)
EGFR: 84 ML/MIN/1.73M2 — SIGNIFICANT CHANGE UP
EGFR: 95 ML/MIN/1.73M2 — SIGNIFICANT CHANGE UP
EOSINOPHIL # BLD AUTO: 0.01 K/UL — SIGNIFICANT CHANGE UP (ref 0–0.5)
EOSINOPHIL # BLD AUTO: 0.09 K/UL — SIGNIFICANT CHANGE UP (ref 0–0.5)
EOSINOPHIL NFR BLD AUTO: 0.1 % — SIGNIFICANT CHANGE UP (ref 0–6)
EOSINOPHIL NFR BLD AUTO: 0.7 % — SIGNIFICANT CHANGE UP (ref 0–6)
ESTIMATED AVERAGE GLUCOSE: 174 MG/DL — HIGH (ref 68–114)
GLUCOSE BLDC GLUCOMTR-MCNC: 233 MG/DL — HIGH (ref 70–99)
GLUCOSE BLDC GLUCOMTR-MCNC: 260 MG/DL — HIGH (ref 70–99)
GLUCOSE BLDC GLUCOMTR-MCNC: 267 MG/DL — HIGH (ref 70–99)
GLUCOSE BLDC GLUCOMTR-MCNC: 415 MG/DL — HIGH (ref 70–99)
GLUCOSE SERPL-MCNC: 272 MG/DL — HIGH (ref 70–99)
GLUCOSE SERPL-MCNC: 278 MG/DL — HIGH (ref 70–99)
HCT VFR BLD CALC: 35.8 % — LOW (ref 39–50)
HCT VFR BLD CALC: 38.9 % — LOW (ref 39–50)
HDLC SERPL-MCNC: 42 MG/DL — SIGNIFICANT CHANGE UP
HGB BLD-MCNC: 11.8 G/DL — LOW (ref 13–17)
HGB BLD-MCNC: 12.9 G/DL — LOW (ref 13–17)
IMM GRANULOCYTES NFR BLD AUTO: 0.4 % — SIGNIFICANT CHANGE UP (ref 0–1.5)
IMM GRANULOCYTES NFR BLD AUTO: 0.6 % — SIGNIFICANT CHANGE UP (ref 0–1.5)
INR BLD: 1.2 RATIO — HIGH (ref 0.88–1.16)
LIPID PNL WITH DIRECT LDL SERPL: 58 MG/DL — SIGNIFICANT CHANGE UP
LYMPHOCYTES # BLD AUTO: 1.16 K/UL — SIGNIFICANT CHANGE UP (ref 1–3.3)
LYMPHOCYTES # BLD AUTO: 19.3 % — SIGNIFICANT CHANGE UP (ref 13–44)
LYMPHOCYTES # BLD AUTO: 2.41 K/UL — SIGNIFICANT CHANGE UP (ref 1–3.3)
LYMPHOCYTES # BLD AUTO: 7.8 % — LOW (ref 13–44)
MAGNESIUM SERPL-MCNC: 1.8 MG/DL — SIGNIFICANT CHANGE UP (ref 1.6–2.6)
MAGNESIUM SERPL-MCNC: 2.2 MG/DL — SIGNIFICANT CHANGE UP (ref 1.6–2.6)
MCHC RBC-ENTMCNC: 28.2 PG — SIGNIFICANT CHANGE UP (ref 27–34)
MCHC RBC-ENTMCNC: 28.4 PG — SIGNIFICANT CHANGE UP (ref 27–34)
MCHC RBC-ENTMCNC: 33 GM/DL — SIGNIFICANT CHANGE UP (ref 32–36)
MCHC RBC-ENTMCNC: 33.2 GM/DL — SIGNIFICANT CHANGE UP (ref 32–36)
MCV RBC AUTO: 85.1 FL — SIGNIFICANT CHANGE UP (ref 80–100)
MCV RBC AUTO: 86.1 FL — SIGNIFICANT CHANGE UP (ref 80–100)
MONOCYTES # BLD AUTO: 1.02 K/UL — HIGH (ref 0–0.9)
MONOCYTES # BLD AUTO: 1.1 K/UL — HIGH (ref 0–0.9)
MONOCYTES NFR BLD AUTO: 6.9 % — SIGNIFICANT CHANGE UP (ref 2–14)
MONOCYTES NFR BLD AUTO: 8.8 % — SIGNIFICANT CHANGE UP (ref 2–14)
NEUTROPHILS # BLD AUTO: 12.5 K/UL — HIGH (ref 1.8–7.4)
NEUTROPHILS # BLD AUTO: 8.76 K/UL — HIGH (ref 1.8–7.4)
NEUTROPHILS NFR BLD AUTO: 70.3 % — SIGNIFICANT CHANGE UP (ref 43–77)
NEUTROPHILS NFR BLD AUTO: 84.4 % — HIGH (ref 43–77)
NON HDL CHOLESTEROL: 67 MG/DL — SIGNIFICANT CHANGE UP
NRBC # BLD: 0 /100 WBCS — SIGNIFICANT CHANGE UP (ref 0–0)
NRBC # BLD: 0 /100 WBCS — SIGNIFICANT CHANGE UP (ref 0–0)
PHOSPHATE SERPL-MCNC: 2.1 MG/DL — LOW (ref 2.5–4.5)
PHOSPHATE SERPL-MCNC: 2.7 MG/DL — SIGNIFICANT CHANGE UP (ref 2.5–4.5)
PLATELET # BLD AUTO: 212 K/UL — SIGNIFICANT CHANGE UP (ref 150–400)
PLATELET # BLD AUTO: 239 K/UL — SIGNIFICANT CHANGE UP (ref 150–400)
POTASSIUM SERPL-MCNC: 4 MMOL/L — SIGNIFICANT CHANGE UP (ref 3.5–5.3)
POTASSIUM SERPL-MCNC: 4.2 MMOL/L — SIGNIFICANT CHANGE UP (ref 3.5–5.3)
POTASSIUM SERPL-SCNC: 4 MMOL/L — SIGNIFICANT CHANGE UP (ref 3.5–5.3)
POTASSIUM SERPL-SCNC: 4.2 MMOL/L — SIGNIFICANT CHANGE UP (ref 3.5–5.3)
PROT SERPL-MCNC: 5.9 G/DL — LOW (ref 6–8.3)
PROT SERPL-MCNC: 6.1 G/DL — SIGNIFICANT CHANGE UP (ref 6–8.3)
PROTHROM AB SERPL-ACNC: 14 SEC — HIGH (ref 10.5–13.4)
RBC # BLD: 4.16 M/UL — LOW (ref 4.2–5.8)
RBC # BLD: 4.57 M/UL — SIGNIFICANT CHANGE UP (ref 4.2–5.8)
RBC # FLD: 13 % — SIGNIFICANT CHANGE UP (ref 10.3–14.5)
RBC # FLD: 13.2 % — SIGNIFICANT CHANGE UP (ref 10.3–14.5)
SODIUM SERPL-SCNC: 136 MMOL/L — SIGNIFICANT CHANGE UP (ref 135–145)
SODIUM SERPL-SCNC: 139 MMOL/L — SIGNIFICANT CHANGE UP (ref 135–145)
TRIGL SERPL-MCNC: 44 MG/DL — SIGNIFICANT CHANGE UP
TROPONIN T, HIGH SENSITIVITY RESULT: 2743 NG/L — HIGH (ref 0–51)
TSH SERPL-MCNC: 0.69 UIU/ML — SIGNIFICANT CHANGE UP (ref 0.27–4.2)
WBC # BLD: 12.47 K/UL — HIGH (ref 3.8–10.5)
WBC # BLD: 14.81 K/UL — HIGH (ref 3.8–10.5)
WBC # FLD AUTO: 12.47 K/UL — HIGH (ref 3.8–10.5)
WBC # FLD AUTO: 14.81 K/UL — HIGH (ref 3.8–10.5)

## 2022-06-18 PROCEDURE — 99233 SBSQ HOSP IP/OBS HIGH 50: CPT

## 2022-06-18 PROCEDURE — 93306 TTE W/DOPPLER COMPLETE: CPT | Mod: 26

## 2022-06-18 PROCEDURE — 93010 ELECTROCARDIOGRAM REPORT: CPT

## 2022-06-18 PROCEDURE — 99292 CRITICAL CARE ADDL 30 MIN: CPT

## 2022-06-18 PROCEDURE — 99291 CRITICAL CARE FIRST HOUR: CPT

## 2022-06-18 RX ORDER — MAGNESIUM SULFATE 500 MG/ML
2 VIAL (ML) INJECTION ONCE
Refills: 0 | Status: COMPLETED | OUTPATIENT
Start: 2022-06-18 | End: 2022-06-18

## 2022-06-18 RX ORDER — INSULIN LISPRO 100/ML
12 VIAL (ML) SUBCUTANEOUS ONCE
Refills: 0 | Status: COMPLETED | OUTPATIENT
Start: 2022-06-18 | End: 2022-06-18

## 2022-06-18 RX ORDER — INSULIN LISPRO 100/ML
8 VIAL (ML) SUBCUTANEOUS
Refills: 0 | Status: DISCONTINUED | OUTPATIENT
Start: 2022-06-18 | End: 2022-06-19

## 2022-06-18 RX ADMIN — CHLORHEXIDINE GLUCONATE 1 APPLICATION(S): 213 SOLUTION TOPICAL at 05:50

## 2022-06-18 RX ADMIN — ATORVASTATIN CALCIUM 80 MILLIGRAM(S): 80 TABLET, FILM COATED ORAL at 21:25

## 2022-06-18 RX ADMIN — Medication 6: at 17:01

## 2022-06-18 RX ADMIN — Medication 8 UNIT(S): at 12:35

## 2022-06-18 RX ADMIN — Medication 8 UNIT(S): at 17:02

## 2022-06-18 RX ADMIN — HEPARIN SODIUM 5000 UNIT(S): 5000 INJECTION INTRAVENOUS; SUBCUTANEOUS at 21:24

## 2022-06-18 RX ADMIN — TICAGRELOR 90 MILLIGRAM(S): 90 TABLET ORAL at 05:49

## 2022-06-18 RX ADMIN — Medication 25 GRAM(S): at 05:48

## 2022-06-18 RX ADMIN — Medication 12: at 08:26

## 2022-06-18 RX ADMIN — TICAGRELOR 90 MILLIGRAM(S): 90 TABLET ORAL at 17:14

## 2022-06-18 RX ADMIN — Medication 12 UNIT(S): at 08:31

## 2022-06-18 RX ADMIN — Medication 6: at 12:34

## 2022-06-18 RX ADMIN — Medication 63.75 MILLIMOLE(S): at 21:24

## 2022-06-18 RX ADMIN — HUMAN INSULIN 20 UNIT(S): 100 INJECTION, SUSPENSION SUBCUTANEOUS at 21:25

## 2022-06-18 RX ADMIN — HEPARIN SODIUM 5000 UNIT(S): 5000 INJECTION INTRAVENOUS; SUBCUTANEOUS at 05:48

## 2022-06-18 RX ADMIN — HUMAN INSULIN 27 UNIT(S): 100 INJECTION, SUSPENSION SUBCUTANEOUS at 08:26

## 2022-06-18 RX ADMIN — Medication 81 MILLIGRAM(S): at 12:34

## 2022-06-18 RX ADMIN — HEPARIN SODIUM 5000 UNIT(S): 5000 INJECTION INTRAVENOUS; SUBCUTANEOUS at 12:34

## 2022-06-18 NOTE — PROGRESS NOTE ADULT - SUBJECTIVE AND OBJECTIVE BOX
BRAD COLLINS  MRN-[id number]  Patient is a 52y old  Male who presents with a chief complaint of Inferior wall STEMI (18 Jun 2022 07:31)    HPI:  52 year old man with type 1 DM on Humulin BID/Humalog pre-meal, HLD on simvastatin, presented to OSH on 6/17 with severe chest pain in the center of his chest, associated with nausea. 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 Patient admits to heavier than usual drinking last night, does not drink daily, according to wife. He was a previous smoker, quit 6 months ago, smoked for >20 years prior.     EMS found patient with a junctional bradycardia, HR in the 30s, atropine given.  given. Patient brought to [facility].    In [place] ED, patient EKG significant for ST elevations in III, AVF, lead II slight elevation; reciprocal changes in V4-V6, HR normalized to 70s.  Patient given 325 aspirin and 600 Plavix. Patient subsequently received Brilinta 180 as well as heparin bolus. Patient transferred ot [facility] for PCI.    At [facility], patient received 2 stents in RCA for 100% stenosis. During procedure, patient with ectopic AVIR, received lidocaine 50x2. EF 50% noted during procedure.      (17 Jun 2022 16:21)      Surgery/Hospital course:    Overnight events:    REVIEW OF SYSTEMS:    CONSTITUTIONAL: No weakness, fevers or chills  EYES/ENT: No visual changes;  No vertigo or throat pain   NECK: No pain or stiffness  RESPIRATORY: No cough, wheezing, hemoptysis; No shortness of breath  CARDIOVASCULAR: No chest pain or palpitations  GASTROINTESTINAL: No abdominal or epigastric pain. No nausea, vomiting, or hematemesis; No diarrhea or constipation. No melena or hematochezia.  GENITOURINARY: No dysuria, frequency or hematuria  NEUROLOGICAL: No numbness or weakness  SKIN: No itching, rashes      Physical Exam:  Vital Signs Last 24 Hrs  T(C): 36.9 (18 Jun 2022 16:00), Max: 37.2 (18 Jun 2022 12:00)  T(F): 98.4 (18 Jun 2022 16:00), Max: 98.9 (18 Jun 2022 12:00)  HR: 60 (18 Jun 2022 19:00) (54 - 82)  BP: 102/52 (18 Jun 2022 19:00) (90/55 - 122/72)  BP(mean): 68 (18 Jun 2022 18:00) (64 - 94)  RR: 21 (18 Jun 2022 18:00) (8 - 38)  SpO2: 95% (18 Jun 2022 18:00) (91% - 97%)  Physical Exam:   Constitutional: NAD, well-groomed, well-developed  HEENT: PERRLA, EOMI, no drainage or redness  Neck: supple,  No JVD  Respiratory: Breath Sounds equal & clear bilaterally to auscultation, no rales/rhonchi/wheezing, no accessory muscle use noted  Cardiovascular: Regular rate, regular rhythm, normal S1, S2; no murmurs or rub  Gastrointestinal: Soft, non-tender, non distended, + bowel sounds  Extremities: BEJARANO x 4, no peripheral edema, no cyanosis, no clubbing   Neurological: A+O x 3; speech clear and intact; no sensory, motor  deficits, normal reflexes  Skin: warm, dry, well perfused  Incisions:    ============================I/O===========================   I&O's Detail    17 Jun 2022 07:01  -  18 Jun 2022 07:00  --------------------------------------------------------  IN:    Oral Fluid: 360 mL  Total IN: 360 mL    OUT:    Voided (mL): 450 mL  Total OUT: 450 mL    Total NET: -90 mL      18 Jun 2022 07:01  -  18 Jun 2022 19:39  --------------------------------------------------------  IN:    Oral Fluid: 720 mL  Total IN: 720 mL    OUT:    Voided (mL): 375 mL  Total OUT: 375 mL    Total NET: 345 mL        ============================ LABS =========================                        11.8   12.47 )-----------( 212      ( 18 Jun 2022 15:43 )             35.8     06-18    136  |  104  |  17  ----------------------------<  278<H>  4.2   |  23  |  1.05    Ca    8.5      18 Jun 2022 15:43  Phos  2.1     06-18  Mg     2.2     06-18    TPro  5.9<L>  /  Alb  3.6  /  TBili  0.4  /  DBili  x   /  AST  138<H>  /  ALT  41  /  AlkPhos  55  06-18    LIVER FUNCTIONS - ( 18 Jun 2022 15:43 )  Alb: 3.6 g/dL / Pro: 5.9 g/dL / ALK PHOS: 55 U/L / ALT: 41 U/L / AST: 138 U/L / GGT: x           PT/INR - ( 18 Jun 2022 04:34 )   PT: 14.0 sec;   INR: 1.20 ratio         PTT - ( 18 Jun 2022 04:34 )  PTT:28.5 sec      ======================Micro/Rad/Cardio=================  Culture: Reviewed   CXR: Reviewed  Echo:Reviewed  ======================================================  PAST MEDICAL & SURGICAL HISTORY:  Diabetes  Type 1 DM      H/O Spinal surgery  2000, compressed nerve        ====================ASSESSMENT ==============  52 year old man with diabetes, family history of CAD and CHF, presented with inferior wall STEMI, s/p 2 EVERETT to RCA, transferred to CCU for further management.       Plan:  ====================== NEUROLOGY=====================  AOx3, no active issues  - neuro checks per protocol    ==================== RESPIRATORY======================  Denies SOB or dyspnea CTA on exam   - Saturating well on room air     ====================CARDIOVASCULAR==================  IWSTEMI  - s/p LHC with EVERETT x2 to 100% RCA. Some intra procedural AIVR with hypotension req. lidocaine  - cont DAPT w/ aspirin and Brilinta  - c/w high intensity statin  - f/u TTE  - trend CE to peak  A1c 7.7 TSH 0.69  - echo: EF 60-65%, normal LVSF w/ basal inferior akinesis       ===================HEMATOLOGIC/ONC ===================  - heparin subQ for DVT ppx   - Trend CBC      aspirin  chewable 81 milliGRAM(s) Oral daily  heparin   Injectable 5000 Unit(s) SubCutaneous every 8 hours  ticagrelor 90 milliGRAM(s) Oral every 12 hours    ===================== RENAL =========================  No active issues  - Trend BUN/SCr  - trend BMP daily, monitor Strict Is&Os  - Keep K > 4, Mg > 2      ==================== GASTROINTESTINAL===================  No active issues  - DASH/TLC/Consistent carb diet       dextrose 5%. 1000 milliLiter(s) (50 mL/Hr) IV Continuous <Continuous>  dextrose 5%. 1000 milliLiter(s) (100 mL/Hr) IV Continuous <Continuous>  sodium phosphate IVPB 15 milliMole(s) IV Intermittent once    =======================    ENDOCRINE  =====================  hx of Diabetes, type 1 (A1C 7.7)   - cont Humulin 27AM/20PM, resume premeal home insulin dose  with sliding scale  - trend finger sticks  - consistent carb diet    atorvastatin 80 milliGRAM(s) Oral at bedtime  dextrose 50% Injectable 25 Gram(s) IV Push once  dextrose 50% Injectable 12.5 Gram(s) IV Push once  dextrose 50% Injectable 25 Gram(s) IV Push once  dextrose Oral Gel 15 Gram(s) Oral once PRN Blood Glucose LESS THAN 70 milliGRAM(s)/deciliter  glucagon  Injectable 1 milliGRAM(s) IntraMuscular once  insulin lispro (ADMELOG) corrective regimen sliding scale   SubCutaneous at bedtime  insulin lispro (ADMELOG) corrective regimen sliding scale   SubCutaneous three times a day before meals  insulin lispro Injectable (ADMELOG) 8 Unit(s) SubCutaneous three times a day before meals  insulin NPH human recombinant 27 Unit(s) SubCutaneous before breakfast  insulin NPH human recombinant 20 Unit(s) SubCutaneous at bedtime    ========================INFECTIOUS DISEASE================  No active issues  - trend CBC and fever curve, monitor off abx      Patient requires continuous monitoring with bedside rhythm monitoring, pulse ox monitoring, and intermittent blood gas analysis. Care plan discussed with ICU care team. Patient remained critical and at risk for life threatening decompensation.  Patient seen, examined and plan discussed with CCU team during rounds.     I have personally provided 35 minutes of critical care time excluding time spent on separate procedures, in addition to initial critical care time provided by the CICU Attending, Dr. Card     BRAD COLLINS  MRN-[id number]  Patient is a 52y old  Male who presents with a chief complaint of Inferior wall STEMI (18 Jun 2022 07:31)    HPI:  52 year old man with type 1 DM on Humulin BID/Humalog pre-meal, HLD on simvastatin, presented to OSH on 6/17 with severe chest pain in the center of his chest, associated with nausea. 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 Patient admits to heavier than usual drinking last night, does not drink daily, according to wife. He was a previous smoker, quit 6 months ago, smoked for >20 years prior.     EMS found patient with a junctional bradycardia, HR in the 30s, atropine given.  given. Patient brought to [facility].    In [place] ED, patient EKG significant for ST elevations in III, AVF, lead II slight elevation; reciprocal changes in V4-V6, HR normalized to 70s.  Patient given 325 aspirin and 600 Plavix. Patient subsequently received Brilinta 180 as well as heparin bolus. Patient transferred ot [facility] for PCI.    At [facility], patient received 2 stents in RCA for 100% stenosis. During procedure, patient with ectopic AVIR, received lidocaine 50x2. EF 50% noted during procedure.      (17 Jun 2022 16:21)      24 hr events: no acute events    REVIEW OF SYSTEMS:    CONSTITUTIONAL: No weakness, fevers or chills  EYES/ENT: No visual changes;  No vertigo or throat pain   NECK: No pain or stiffness  RESPIRATORY: No cough, wheezing, hemoptysis; No shortness of breath  CARDIOVASCULAR: No chest pain or palpitations  GASTROINTESTINAL: No abdominal or epigastric pain. No nausea, vomiting, or hematemesis; No diarrhea or constipation. No melena or hematochezia.  GENITOURINARY: No dysuria, frequency or hematuria  NEUROLOGICAL: No numbness or weakness  SKIN: No itching, rashes      Physical Exam:  Vital Signs Last 24 Hrs  T(C): 36.9 (18 Jun 2022 16:00), Max: 37.2 (18 Jun 2022 12:00)  T(F): 98.4 (18 Jun 2022 16:00), Max: 98.9 (18 Jun 2022 12:00)  HR: 60 (18 Jun 2022 19:00) (54 - 82)  BP: 102/52 (18 Jun 2022 19:00) (90/55 - 122/72)  BP(mean): 68 (18 Jun 2022 18:00) (64 - 94)  RR: 21 (18 Jun 2022 18:00) (8 - 38)  SpO2: 95% (18 Jun 2022 18:00) (91% - 97%)      ============================I/O===========================   I&O's Detail    17 Jun 2022 07:01  -  18 Jun 2022 07:00  --------------------------------------------------------  IN:    Oral Fluid: 360 mL  Total IN: 360 mL    OUT:    Voided (mL): 450 mL  Total OUT: 450 mL    Total NET: -90 mL      18 Jun 2022 07:01  -  18 Jun 2022 19:39  --------------------------------------------------------  IN:    Oral Fluid: 720 mL  Total IN: 720 mL    OUT:    Voided (mL): 375 mL  Total OUT: 375 mL    Total NET: 345 mL        ============================ LABS =========================                        11.8   12.47 )-----------( 212      ( 18 Jun 2022 15:43 )             35.8     06-18    136  |  104  |  17  ----------------------------<  278<H>  4.2   |  23  |  1.05    Ca    8.5      18 Jun 2022 15:43  Phos  2.1     06-18  Mg     2.2     06-18    TPro  5.9<L>  /  Alb  3.6  /  TBili  0.4  /  DBili  x   /  AST  138<H>  /  ALT  41  /  AlkPhos  55  06-18    LIVER FUNCTIONS - ( 18 Jun 2022 15:43 )  Alb: 3.6 g/dL / Pro: 5.9 g/dL / ALK PHOS: 55 U/L / ALT: 41 U/L / AST: 138 U/L / GGT: x           PT/INR - ( 18 Jun 2022 04:34 )   PT: 14.0 sec;   INR: 1.20 ratio         PTT - ( 18 Jun 2022 04:34 )  PTT:28.5 sec      ======================Micro/Rad/Cardio=================  Culture: Reviewed   CXR: Reviewed  Echo:Reviewed  ======================================================  PAST MEDICAL & SURGICAL HISTORY:  Diabetes  Type 1 DM      H/O Spinal surgery  2000, compressed nerve        ====================ASSESSMENT ==============  52 year old man with diabetes, family history of CAD and CHF, presented with inferior wall STEMI, s/p 2 EVERETT to RCA, transferred to CCU for further management.       Plan:  ====================== NEUROLOGY=====================  AOx3, no active issues  - neuro checks per protocol    ==================== RESPIRATORY======================  Denies SOB or dyspnea CTA on exam   - Saturating well on room air     ====================CARDIOVASCULAR==================  IWSTEMI  - s/p LHC with EVERETT x2 to 100% RCA. Some intra procedural AIVR with hypotension req. lidocaine  - cont DAPT w/ aspirin and Brilinta  - c/w high intensity statin  - f/u TTE  - trend CE to peak  A1c 7.7 TSH 0.69  - echo: EF 60-65%, normal LVSF w/ basal inferior akinesis       ===================HEMATOLOGIC/ONC ===================  - heparin subQ for DVT ppx   - Trend CBC      aspirin  chewable 81 milliGRAM(s) Oral daily  heparin   Injectable 5000 Unit(s) SubCutaneous every 8 hours  ticagrelor 90 milliGRAM(s) Oral every 12 hours    ===================== RENAL =========================  No active issues  - Trend BUN/SCr  - trend BMP daily, monitor Strict Is&Os  - Keep K > 4, Mg > 2      ==================== GASTROINTESTINAL===================  No active issues  - DASH/TLC/Consistent carb diet       dextrose 5%. 1000 milliLiter(s) (50 mL/Hr) IV Continuous <Continuous>  dextrose 5%. 1000 milliLiter(s) (100 mL/Hr) IV Continuous <Continuous>  sodium phosphate IVPB 15 milliMole(s) IV Intermittent once    =======================    ENDOCRINE  =====================  hx of Diabetes, type 1 (A1C 7.7)   - cont Humulin 27AM/20PM, resume premeal home insulin dose  with sliding scale  - trend finger sticks  - consistent carb diet    atorvastatin 80 milliGRAM(s) Oral at bedtime  dextrose 50% Injectable 25 Gram(s) IV Push once  dextrose 50% Injectable 12.5 Gram(s) IV Push once  dextrose 50% Injectable 25 Gram(s) IV Push once  dextrose Oral Gel 15 Gram(s) Oral once PRN Blood Glucose LESS THAN 70 milliGRAM(s)/deciliter  glucagon  Injectable 1 milliGRAM(s) IntraMuscular once  insulin lispro (ADMELOG) corrective regimen sliding scale   SubCutaneous at bedtime  insulin lispro (ADMELOG) corrective regimen sliding scale   SubCutaneous three times a day before meals  insulin lispro Injectable (ADMELOG) 8 Unit(s) SubCutaneous three times a day before meals  insulin NPH human recombinant 27 Unit(s) SubCutaneous before breakfast  insulin NPH human recombinant 20 Unit(s) SubCutaneous at bedtime    ========================INFECTIOUS DISEASE================  No active issues  - trend CBC and fever curve, monitor off abx      Patient requires continuous monitoring with bedside rhythm monitoring, pulse ox monitoring, and intermittent blood gas analysis. Care plan discussed with ICU care team. Patient remained critical and at risk for life threatening decompensation.  Patient seen, examined and plan discussed with CCU team during rounds.     I have personally provided 35 minutes of critical care time excluding time spent on separate procedures, in addition to initial critical care time provided by the CICU Attending, Dr. Card

## 2022-06-18 NOTE — CONSULT NOTE ADULT - SUBJECTIVE AND OBJECTIVE BOX
[facility] Consultation    CHIEF COMPLAINT: Patient is a 52y old  Male who presents with a chief complaint of Inferior wall STEMI (18 Jun 2022 07:31)      HPI:  52 year old man with type 1 DM on Humulin BID/Humalog pre-meal, HLD on simvastatin, presented to OSH on 6/17 with severe chest pain in the center of his chest, associated with nausea. 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 Patient admits to heavier than usual drinking last night, does not drink daily, according to wife. He was a previous smoker, quit 6 months ago, smoked for >20 years prior.     EMS found patient with a junctional bradycardia, HR in the 30s, atropine given.  given. Patient brought to [facility].    In [place] ED, patient EKG significant for ST elevations in III, AVF, lead II slight elevation; reciprocal changes in V4-V6, HR normalized to 70s.  Patient given 325 aspirin and 600 Plavix. Patient subsequently received Brilinta 180 as well as heparin bolus. Patient transferred ot [facility] for PCI.    At [facility], patient received 2 stents in RCA for 100% stenosis. During procedure, patient with ectopic AIVR, received lidocaine 50x2. EF 50% noted during procedure.     He is awake and alert this morning with no further pain.  His blood pressure has been somewhat low at times but is stable at present.  He reports no chest pain, dyspnea, palpitations, or lightheadedness      PAST MEDICAL & SURGICAL HISTORY:  Diabetes  Type 1 DM      H/O Spinal surgery  2000, compressed nerve          SOCIAL HISTORY:    FAMILY HISTORY: FAMILY HISTORY:  FH: coronary artery disease  Mother    FH: heart failure  Mother        MEDICATIONS  (STANDING):  aspirin  chewable 81 milliGRAM(s) Oral daily  atorvastatin 80 milliGRAM(s) Oral at bedtime  chlorhexidine 4% Liquid 1 Application(s) Topical <User Schedule>  dextrose 5%. 1000 milliLiter(s) (50 mL/Hr) IV Continuous <Continuous>  dextrose 5%. 1000 milliLiter(s) (100 mL/Hr) IV Continuous <Continuous>  dextrose 50% Injectable 25 Gram(s) IV Push once  dextrose 50% Injectable 12.5 Gram(s) IV Push once  dextrose 50% Injectable 25 Gram(s) IV Push once  glucagon  Injectable 1 milliGRAM(s) IntraMuscular once  heparin   Injectable 5000 Unit(s) SubCutaneous every 8 hours  insulin lispro (ADMELOG) corrective regimen sliding scale   SubCutaneous at bedtime  insulin lispro (ADMELOG) corrective regimen sliding scale   SubCutaneous three times a day before meals  insulin lispro Injectable (ADMELOG) 8 Unit(s) SubCutaneous three times a day before meals  insulin NPH human recombinant 27 Unit(s) SubCutaneous before breakfast  insulin NPH human recombinant 20 Unit(s) SubCutaneous at bedtime  ticagrelor 90 milliGRAM(s) Oral every 12 hours    MEDICATIONS  (PRN):  dextrose Oral Gel 15 Gram(s) Oral once PRN Blood Glucose LESS THAN 70 milliGRAM(s)/deciliter      Allergies    No Known Allergies    Intolerances        REVIEW OF SYSTEMS:    CONSTITUTIONAL: No weakness, fevers or chills  EYES: No visual changes, No diplopia  ENMT: No throat pain , No exudate  NECK: No pain or stiffness  RESPIRATORY: No cough, wheezing, hemoptysis; No shortness of breath  CARDIOVASCULAR: No chest pain or palpitations  GASTROINTESTINAL: No abdominal pain. No nausea, vomiting, or hematemesis; No diarrhea or constipation. No melena or hematochezia.  GENITOURINARY: No dysuria, frequency or hematuria  NEUROLOGICAL: No numbness or weakness  SKIN: No itching or rash  All other review of systems is negative unless indicated above    VITAL SIGNS:   Vital Signs Last 24 Hrs  T(C): 37.2 (18 Jun 2022 12:00), Max: 37.2 (18 Jun 2022 12:00)  T(F): 98.9 (18 Jun 2022 12:00), Max: 98.9 (18 Jun 2022 12:00)  HR: 55 (18 Jun 2022 13:00) (54 - 87)  BP: 93/54 (18 Jun 2022 13:00) (90/55 - 153/78)  BP(mean): 68 (18 Jun 2022 13:00) (64 - 94)  RR: 19 (18 Jun 2022 13:00) (8 - 38)  SpO2: 95% (18 Jun 2022 13:00) (91% - 100%)    I&O's Summary    17 Jun 2022 07:01  -  18 Jun 2022 07:00  --------------------------------------------------------  IN: 360 mL / OUT: 450 mL / NET: -90 mL    18 Jun 2022 07:01  -  18 Jun 2022 13:51  --------------------------------------------------------  IN: 480 mL / OUT: 375 mL / NET: 105 mL        PHYSICAL EXAM:    Constitutional: NAD, awake and alert, well-developed  Eyes:  EOMI,  Pupils round, no lesions  ENMT: no exudate or erythema  Pulmonary: Non-labored, breath sounds are clear bilaterally, No wheezing, rales or rhonchi  Cardiovascular: PMI non-displaced Regular S1 and S2, no murmurs, rubs, gallops or clicks  Gastrointestinal: Bowel Sounds present, soft, nontender.   Lymph: No peripheral edema. No cervical lymphadenopathy.  Neurological: Alert, no focal deficits  Skin: No rashes. No cyanosis.  Psych:  Mood & affect appropriate    LABS: All Labs Reviewed:                        12.9   14.81 )-----------( 239      ( 18 Jun 2022 04:34 )             38.9                         13.8   6.72  )-----------( 262      ( 17 Jun 2022 14:36 )             40.7     18 Jun 2022 04:34    139    |  104    |  14     ----------------------------<  272    4.0     |  22     |  0.95   17 Jun 2022 14:36    140    |  106    |  13     ----------------------------<  227    4.0     |  25     |  1.30     Ca    8.1        18 Jun 2022 04:34  Ca    9.3        17 Jun 2022 14:36  Phos  2.7       18 Jun 2022 04:34  Mg     1.8       18 Jun 2022 04:34    TPro  6.1    /  Alb  3.7    /  TBili  0.5    /  DBili  x      /  AST  199    /  ALT  43     /  AlkPhos  55     18 Jun 2022 04:34  TPro  7.2    /  Alb  3.9    /  TBili  0.4    /  DBili  x      /  AST  23     /  ALT  32     /  AlkPhos  59     17 Jun 2022 14:36    PT/INR - ( 18 Jun 2022 04:34 )   PT: 14.0 sec;   INR: 1.20 ratio         PTT - ( 18 Jun 2022 04:34 )  PTT:28.5 sec  CARDIAC MARKERS ( 18 Jun 2022 04:34 )  x     / x     / 1826 U/L / x     / 99.9 ng/mL  CARDIAC MARKERS ( 17 Jun 2022 22:26 )  x     / x     / 2174 U/L / x     / 130.2 ng/mL        06-17 @ 14:36  Pro Bnp 22 06-17 @ 22:26  TSH: 0.69    Troponin T, High Sensitivity (06.18.22 @ 04:34)   Troponin T, High Sensitivity Result: 2743: Specimen not hemolyzed   *   *   Rapid upward or downward changes in high-sensitivity troponin levels   suggest acute myocardial injury. Renal impairment may cause sustained   troponin elevations.   Normal: <6 - 14 ng/L   Indeterminate: 15-51 ng/L   Elevated: > 51 ng/L   See http://labs/test/TROPTHS on the [facility] intranet for more   information ng/L       Historical Values  Troponin T, High Sensitivity (06.18.22 @ 04:34)   Troponin T, High Sensitivity Result: 2743: Specimen not hemolyzed   Troponin T, High Sensitivity (06.17.22 @ 22:26)   Troponin T, High Sensitivity Result: 2179: Specimen not hemolyzed  < from: 12 Lead ECG (06.17.22 @ 16:24) >    Ventricular Rate 73 BPM    Atrial Rate 73 BPM    P-R Interval 162 ms    QRS Duration 94 ms    Q-T Interval 386 ms    QTC Calculation(Bazett) 425 ms    P Axis 51 degrees    R Axis 54 degrees    T Axis 104 degrees    Diagnosis Line NORMAL SINUS RHYTHM  ST & T WAVE ABNORMALITY, CONSIDER LATERAL ISCHEMIA  ABNORMAL ECG  NO PREVIOUS ECGS AVAILABLE  Confirmed by MD Almaz, [facility] ([id number]) on 6/18/2022 10:56:01 AM    < end of copied text >  < from: TTE with Doppler (w/3D Echo) (Transthoracic Echocardiogram) (06.18.22 @ 07:40) >    Patient name: BRAD COLLINS  YOB: 1970   Age: 52 (M)   MR#: [id number]  Study Date: 6/18/2022  [facility]  Study quality: Technically good  Referring Physician: Zuhair Clarke MD  Blood Pressure: 101/53 mmHg  Height: 173 cm  Weight: 86 kg  BSA: 2 m2  Heart Rhythm: Normal sinus  Heart Rate: 68 mmHg  ------------------------------------------------------------------------  PROCEDURE: Transthoracic echocardiogram with 2-D, M-Mode  and complete spectral and color flow Doppler. Real-time and  reconstructed 3-dimensional imaging was performed.  Color  Doppler analysis was carried out.  INDICATION: ST elevation (STEMI) myocardial infarction  involving other coronary artery of inferior wall (I21.19)  ------------------------------------------------------------------------  Dimensions:    Normal Values:  LA:     3.4    2.0 - 4.0 cm  Ao:     2.7    2.0 - 3.8 cm  SEPTUM: 1.0    0.6 - 1.2 cm  PWT:    0.9    0.6 - 1.1 cm  LVIDd:  4.5    3.0- 5.6 cm  LVIDs:  2.7    1.8 - 4.0 cm  Derived variables:  LVMI: 71 g/m2  RWT: 0.40  EF (Visual Estimate): 60-65 %  ------------------------------------------------------------------------  Observations:  Mitral Valve: Normal mitral valve.  Aortic Valve/Aorta: Normal aortic valve.  Normal aortic root size.  Left Atrium: Normal left atrium.  Left Ventricle: Normal left ventricular internal dimensions  and wall thicknesses.  Normal left ventricular systolic function, with basal  inferior akinesis.  Normal diastolic function.  Right Heart: Normal right atrium. Normal right ventricular  size and function.  Normal tricuspid valve. Normal pulmonic valve.  Pericardium/Pleura: Normal pericardium with no pericardial  effusion.  Hemodynamic: Estimated right atrial pressure is normal.  No evidence of pulmonary hypertension.  ------------------------------------------------------------------------  Conclusions:  Normal left ventricular systolic function, with basal  inferior akinesis.  ------------------------------------------------------------------------  Confirmed on  6/18/2022 - 12:11:28 by Devin Bennett MD, FASE  ------------------------------------------------------------------------    < end of copied text >

## 2022-06-18 NOTE — PROGRESS NOTE ADULT - SUBJECTIVE AND OBJECTIVE BOX
CICU DAY NOTE  Admission date: 6/17/22  Chief complaint/ Diagnosis  HPI: 52 year old man with type 1 DM on Humulin BID/Humalog pre-meal, HLD on simvastatin, presented to OSH on 6/17 with severe chest pain in the center of his chest, associated with nausea. 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 Patient admits to heavier than usual drinking last night, does not drink daily, according to wife. He was a previous smoker, quit 6 months ago, smoked for >20 years prior.   EMS found patient with a junctional bradycardia, HR in the 30s, atropine given.  given. Patient brought to [facility].  In [place] ED, patient EKG significant for ST elevations in III, AVF, lead II slight elevation; reciprocal changes in V4-V6, HR normalized to 70s.  Patient given 325 aspirin and 600 Plavix. Patient subsequently received Brilinta 180 as well as heparin bolus. Patient transferred ot [facility] for PCI.  At [facility], patient received 2 stents in RCA for 100% stenosis. During procedure, patient with ectopic AVIR, received lidocaine 50x2. EF 50% noted during procedure.     Interval history: admitted w/ IWSTEMI  s/p C with EVERETT x2 to 100% RCA. Some intraop AIVR with hypotension req. lidoca  cardiac enzyme peaked f/s 220-272  Remains hemodynamically stable    REVIEW OF SYSTEMS  Denies CP, Palpitation, SOB, Dyspnea[x  ] All other systems negative     MEDICATIONS  (STANDING):  aspirin  chewable 81 milliGRAM(s) Oral daily  atorvastatin 80 milliGRAM(s) Oral at bedtime  glucagon  Injectable 1 milliGRAM(s) IntraMuscular once  heparin   Injectable 5000 Unit(s) SubCutaneous every 8 hours  insulin lispro (ADMELOG) corrective regimen sliding scale   SubCutaneous at bedtime  insulin lispro (ADMELOG) corrective regimen sliding scale   SubCutaneous three times a day before meals  insulin lispro Injectable (ADMELOG) 8 Unit(s) SubCutaneous three times a day before meals  insulin NPH human recombinant 27 Unit(s) SubCutaneous before breakfast  insulin NPH human recombinant 20 Unit(s) SubCutaneous at bedtime  ticagrelor 90 milliGRAM(s) Oral every 12 hours    MEDICATIONS  (PRN):  dextrose Oral Gel 15 Gram(s) Oral once PRN Blood Glucose LESS THAN 70 milliGRAM(s)/deciliter    PAST MEDICAL & SURGICAL HISTORY:  Diabetes Type 1 DM  H/O Spinal surgery 2000, compressed nerve    FAMILY HISTORY:  FH: coronary artery disease Mother  FH: heart failure Mother    Allergy   No Known Allergies    ICU Vital Signs Last 24 Hrs  T(C): 36.6 (Max: 36.8)  HR: 68  (55 - 87)  BP: 101/53  (90/55 - 153/78)  BP(mean): 73 (66 - 94)  RR: 24  (8 - 38)  SpO2: 91% (91% - 100%) on room air     I&O's Summary  IN: 360 mL / OUT: 450 mL / NET: -90 mL    PHYSICAL EXAM  Appearance: Normal, NAD  HEAD:  Normocephalic  EYES:  PERRLA, conjunctiva and sclera clear  NECK: Supple, No JVD  CHEST/LUNG: Clear to auscultation bilaterally; No wheezing  HEART: Normal S1, S2. No murmurs, rubs, or gallops  ABDOMEN: + Bowel sounds, Soft, NT, ND   EXTREMITIES:  2+ Peripheral Pulses, No clubbing, cyanosis, or edema  NEUROLOGY: non-focal, aaox3  Lymphatic: No lymphadenopathy  SKIN: No rashes or lesions, right radial w/o bleeding or hematoma     Interpretation of Telemetry: SR                           12.9   14.81 )-----------( 239                 38.9       139  |  104  |  14  ---------------------<  272<H>  4.0   |  22  |  0.95    Ca    8.1<L>    Phos  2.7     Mg     1.8 (repleted)  TPro  6.1  /  Alb  3.7  /  TBili  0.5  /  DBili  x   /  AST  199<H>  /  ALT  43  /  AlkPhos  55        CARDIAC MARKERS ( 18 Jun 2022 04:34 )  x     / x     / 1826 U/L / x     / 99.9 ng/mL  CARDIAC MARKERS ( 17 Jun 2022 22:26 )  x     / x     / 2174 U/L / x     / 130.2 ng/mL    F/S 130-223     CICU DAY NOTE  Admission date: 6/17/22  Chief complaint/ Diagnosis  HPI: 52 year old ex-smoker (quit 6months ago) man with type 1 DM HLD  presented to [facility] on 6/17 with severe chest pain in the center of his chest, associated with nausea. EMS found patient with a junctional bradycardia, HR in the 30s, atropine given.  given.   In [place] ED, patient EKG significant for ST elevations in III, AVF, lead II slight elevation; reciprocal changes in V4-V6, HR normalized to 70s.  Patient given 325 aspirin and 600 Plavix. Patient subsequently received Brilinta 180 as well as heparin bolus. Patient transferred ot [facility] for PCI.  At [facility], patient received 2 stents in RCA for 100% stenosis. During procedure, patient with ectopic AVIR, received lidocaine 50x2. EF 50% noted during procedure.     Interval history: admitted w/ IWSTEMI  s/p C with EVERETT x2 to 100% RCA. Some intraop AIVR with hypotension req. lidoca  cardiac enzyme peaked TROP 2743<2179  f/s 220-272  Remains hemodynamically stable: bP 90/50's HR 54-61     REVIEW OF SYSTEMS  Denies CP, Palpitation, SOB, Dyspnea[x  ] All other systems negative     MEDICATIONS  (STANDING):  aspirin  chewable 81 milliGRAM(s) Oral daily  atorvastatin 80 milliGRAM(s) Oral at bedtime  glucagon  Injectable 1 milliGRAM(s) IntraMuscular once  heparin   Injectable 5000 Unit(s) SubCutaneous every 8 hours  insulin lispro (ADMELOG) corrective regimen sliding scale   SubCutaneous at bedtime  insulin lispro (ADMELOG) corrective regimen sliding scale   SubCutaneous three times a day before meals  insulin lispro Injectable (ADMELOG) 8 Unit(s) SubCutaneous three times a day before meals  insulin NPH human recombinant 27 Unit(s) SubCutaneous before breakfast  insulin NPH human recombinant 20 Unit(s) SubCutaneous at bedtime  ticagrelor 90 milliGRAM(s) Oral every 12 hours    MEDICATIONS  (PRN):  dextrose Oral Gel 15 Gram(s) Oral once PRN Blood Glucose LESS THAN 70 milliGRAM(s)/deciliter    PAST MEDICAL & SURGICAL HISTORY:  Diabetes Type 1 DM  H/O Spinal surgery 2000, compressed nerve    FAMILY HISTORY:  FH: coronary artery disease Mother  FH: heart failure Mother    Allergy   No Known Allergies    ICU Vital Signs Last 24 Hrs  T(C): 36.6 (Max: 36.8)  HR: 68  (55 - 87)  BP: 101/53  (90/55 - 153/78)  BP(mean): 73 (66 - 94)  RR: 24  (8 - 38)  SpO2: 91% (91% - 100%) on room air     I&O's Summary  IN: 360 mL / OUT: 450 mL / NET: -90 mL    PHYSICAL EXAM  Appearance: Normal, NAD  HEAD:  Normocephalic  EYES:  PERRLA, conjunctiva and sclera clear  NECK: Supple, No JVD  CHEST/LUNG: Clear to auscultation bilaterally; No wheezing  HEART: Normal S1, S2. No murmurs, rubs, or gallops  ABDOMEN: + Bowel sounds, Soft, NT, ND   EXTREMITIES:  2+ Peripheral Pulses, No clubbing, cyanosis, or edema  NEUROLOGY: non-focal, aaox3  Lymphatic: No lymphadenopathy  SKIN: No rashes or lesions, right radial w/o bleeding or hematoma     Interpretation of Telemetry: SR                                  12.9 <13.8  14.81<6.72 )-----------( 239                            38.9       139  |  104  |  14  ---------------------<  272<H>  4.0   |  22  |  0.95    Ca    8.1<L>    Phos  2.7     Mg     1.8 (repleted)  TPro  6.1  /  Alb  3.7  /  TBili  0.5  /  DBili  x   /  AST  199<H>  /  ALT  43  /  AlkPhos  55        CARDIAC MARKERS ( 18 Jun 2022 04:34 )  x     / x     / 1826 U/L / x     / 99.9 ng/mL  CARDIAC MARKERS ( 17 Jun 2022 22:26 )  x     / x     / 2174 U/L / x     / 130.2 ng/mL    F/S 130-223

## 2022-06-18 NOTE — PROGRESS NOTE ADULT - ASSESSMENT
ASSESSMENT & PLAN:  52 year old man with diabetes, family history of CAD and CHF, presented with inferior wall STEMI, s/p 2 EVERETT to RCA, transferred to CCU for further management.     Neuro:  AOx3, no active issues  - neuro checks per protocol    Pulmonary:  No active issues  - Saturating well on room air    CV:  #IWSTEMI  - s/p LHC with EVERETT x2 to 100% RCA. Some intraop AIVR with hypotension req. lidocaine  - cont DAPT w/ aspirin and Brilinta  - c/w high intensity statin  - f/u TTE  - trend CE to peak  - f/u lipid panel, A1c, TSH    Renal:  No active issues  - Trend BUN/SCr  - trend BMP daily, monitor Strict Is&Os  - Keep K > 4, Mg > 2    GI:  No active issues  - DASH/TLC/Consistent carb diet     Endocrine:  #Diabetes, type 1  - f/u A1c  - cont Humulin 27AM/20PM, 6 Humalog pre-meal (home dose 8U) with sliding scale  - trend finger sticks  - consistent carb diet    ID:  No active issues  - trend CBC and fever curve, monitor off abx    Heme:  - heparin subQ for DVT ppx   - Trend CBC   ASSESSMENT & PLAN:  52 year old man with diabetes, family history of CAD and CHF, presented with inferior wall STEMI, s/p 2 EVERETT to RCA, transferred to CCU for further management.     Neuro:  AOx3, no active issues  - neuro checks per protocol    Pulmonary:  Denies SOB or dyspnea CTA on exam   - Saturating well on room air    CV: admitted w/ IWSTEMI  s/p LHC with EVERETT x2 to 100% RCA. Some intraop AIVR with hypotension req. lidocaine  - cont DAPT w/ aspirin and Brilinta  - c/w high intensity statin  - f/u TTE  - trend CE to peak  A1c 7.7 TSH 0.69    Renal:  No active issues  - Trend BUN/SCr  - trend BMP daily, monitor Strict Is&Os  - Keep K > 4, Mg > 2    GI:  No active issues  - DASH/TLC/Consistent carb diet     Endocrine:: hx of Diabetes, type 1 (A1C 7.7)   - cont Humulin 27AM/20PM, resume premeal home insulin dose  with sliding scale  - trend finger sticks  - consistent carb diet    ID:  No active issues  - trend CBC and fever curve, monitor off abx    Heme:  - heparin subQ for DVT ppx   - Trend CBC   ASSESSMENT & PLAN:  52 year old man with diabetes, family history of CAD and CHF, presented with inferior wall STEMI, s/p 2 EVERETT to RCA, transferred to CCU for further management.     Neuro:  AOx3, no active issues  - neuro checks per protocol    Pulmonary:  Denies SOB or dyspnea CTA on exam   - Saturating well on room air    CV: admitted w/ IWSTEMI  s/p LHC with EVERETT x2 to 100% RCA. Some intra procedural AIVR with hypotension req. lidocaine  - cont DAPT w/ aspirin and Brilinta  - c/w high intensity statin  - f/u TTE  - trend CE to peak  A1c 7.7 TSH 0.69    Renal:  No active issues  - Trend BUN/SCr  - trend BMP daily, monitor Strict Is&Os  - Keep K > 4, Mg > 2    GI:  No active issues  - DASH/TLC/Consistent carb diet     Endocrine:: hx of Diabetes, type 1 (A1C 7.7)   - cont Humulin 27AM/20PM, resume premeal home insulin dose  with sliding scale  - trend finger sticks  - consistent carb diet    ID:  No active issues  - trend CBC and fever curve, monitor off abx    Heme:  - heparin subQ for DVT ppx   - Trend CBC

## 2022-06-18 NOTE — CONSULT NOTE ADULT - ASSESSMENT
And is hemodynamically stable status post acute ST elevation myocardial infarction with primary intervention including 2 drug-eluting stents to the right coronary artery.  He had some AI VR now without dysrhythmias.  He has no evidence for heart failure.  We did extensive discussion concerning the etiology, evaluation, and management of his disease    Recommend    Continue ICU monitoring    Continue dual antiplatelet therapy    Continue statin therapy    Cardiovascular risk reduction counseling done    Further management will be dependent on his clinical course

## 2022-06-19 ENCOUNTER — TRANSCRIPTION ENCOUNTER (OUTPATIENT)
Age: 52
End: 2022-06-19

## 2022-06-19 DIAGNOSIS — E10.59 TYPE 1 DIABETES MELLITUS WITH OTHER CIRCULATORY COMPLICATIONS: ICD-10-CM

## 2022-06-19 DIAGNOSIS — E78.5 HYPERLIPIDEMIA, UNSPECIFIED: ICD-10-CM

## 2022-06-19 DIAGNOSIS — E10.65 TYPE 1 DIABETES MELLITUS WITH HYPERGLYCEMIA: ICD-10-CM

## 2022-06-19 DIAGNOSIS — I10 ESSENTIAL (PRIMARY) HYPERTENSION: ICD-10-CM

## 2022-06-19 LAB
ALBUMIN SERPL ELPH-MCNC: 3.7 G/DL — SIGNIFICANT CHANGE UP (ref 3.3–5)
ALP SERPL-CCNC: 54 U/L — SIGNIFICANT CHANGE UP (ref 40–120)
ALT FLD-CCNC: 35 U/L — SIGNIFICANT CHANGE UP (ref 10–45)
ANION GAP SERPL CALC-SCNC: 9 MMOL/L — SIGNIFICANT CHANGE UP (ref 5–17)
AST SERPL-CCNC: 90 U/L — HIGH (ref 10–40)
BASOPHILS # BLD AUTO: 0.08 K/UL — SIGNIFICANT CHANGE UP (ref 0–0.2)
BASOPHILS NFR BLD AUTO: 0.7 % — SIGNIFICANT CHANGE UP (ref 0–2)
BILIRUB SERPL-MCNC: 0.5 MG/DL — SIGNIFICANT CHANGE UP (ref 0.2–1.2)
BUN SERPL-MCNC: 16 MG/DL — SIGNIFICANT CHANGE UP (ref 7–23)
CALCIUM SERPL-MCNC: 8.5 MG/DL — SIGNIFICANT CHANGE UP (ref 8.4–10.5)
CHLORIDE SERPL-SCNC: 105 MMOL/L — SIGNIFICANT CHANGE UP (ref 96–108)
CO2 SERPL-SCNC: 25 MMOL/L — SIGNIFICANT CHANGE UP (ref 22–31)
CREAT SERPL-MCNC: 0.92 MG/DL — SIGNIFICANT CHANGE UP (ref 0.5–1.3)
EGFR: 99 ML/MIN/1.73M2 — SIGNIFICANT CHANGE UP
EOSINOPHIL # BLD AUTO: 0.12 K/UL — SIGNIFICANT CHANGE UP (ref 0–0.5)
EOSINOPHIL NFR BLD AUTO: 1.1 % — SIGNIFICANT CHANGE UP (ref 0–6)
GLUCOSE BLDC GLUCOMTR-MCNC: 152 MG/DL — HIGH (ref 70–99)
GLUCOSE BLDC GLUCOMTR-MCNC: 55 MG/DL — LOW (ref 70–99)
GLUCOSE BLDC GLUCOMTR-MCNC: 63 MG/DL — LOW (ref 70–99)
GLUCOSE BLDC GLUCOMTR-MCNC: 80 MG/DL — SIGNIFICANT CHANGE UP (ref 70–99)
GLUCOSE BLDC GLUCOMTR-MCNC: 88 MG/DL — SIGNIFICANT CHANGE UP (ref 70–99)
GLUCOSE SERPL-MCNC: 103 MG/DL — HIGH (ref 70–99)
HCT VFR BLD CALC: 34.9 % — LOW (ref 39–50)
HGB BLD-MCNC: 11.5 G/DL — LOW (ref 13–17)
IMM GRANULOCYTES NFR BLD AUTO: 0.5 % — SIGNIFICANT CHANGE UP (ref 0–1.5)
LYMPHOCYTES # BLD AUTO: 2.6 K/UL — SIGNIFICANT CHANGE UP (ref 1–3.3)
LYMPHOCYTES # BLD AUTO: 24.1 % — SIGNIFICANT CHANGE UP (ref 13–44)
MAGNESIUM SERPL-MCNC: 1.9 MG/DL — SIGNIFICANT CHANGE UP (ref 1.6–2.6)
MCHC RBC-ENTMCNC: 28.3 PG — SIGNIFICANT CHANGE UP (ref 27–34)
MCHC RBC-ENTMCNC: 33 GM/DL — SIGNIFICANT CHANGE UP (ref 32–36)
MCV RBC AUTO: 85.7 FL — SIGNIFICANT CHANGE UP (ref 80–100)
MONOCYTES # BLD AUTO: 1.03 K/UL — HIGH (ref 0–0.9)
MONOCYTES NFR BLD AUTO: 9.6 % — SIGNIFICANT CHANGE UP (ref 2–14)
NEUTROPHILS # BLD AUTO: 6.89 K/UL — SIGNIFICANT CHANGE UP (ref 1.8–7.4)
NEUTROPHILS NFR BLD AUTO: 64 % — SIGNIFICANT CHANGE UP (ref 43–77)
NRBC # BLD: 0 /100 WBCS — SIGNIFICANT CHANGE UP (ref 0–0)
PHOSPHATE SERPL-MCNC: 3 MG/DL — SIGNIFICANT CHANGE UP (ref 2.5–4.5)
PLATELET # BLD AUTO: 214 K/UL — SIGNIFICANT CHANGE UP (ref 150–400)
POTASSIUM SERPL-MCNC: 3.7 MMOL/L — SIGNIFICANT CHANGE UP (ref 3.5–5.3)
POTASSIUM SERPL-SCNC: 3.7 MMOL/L — SIGNIFICANT CHANGE UP (ref 3.5–5.3)
PROT SERPL-MCNC: 6 G/DL — SIGNIFICANT CHANGE UP (ref 6–8.3)
RBC # BLD: 4.07 M/UL — LOW (ref 4.2–5.8)
RBC # FLD: 13.2 % — SIGNIFICANT CHANGE UP (ref 10.3–14.5)
SODIUM SERPL-SCNC: 139 MMOL/L — SIGNIFICANT CHANGE UP (ref 135–145)
WBC # BLD: 10.77 K/UL — HIGH (ref 3.8–10.5)
WBC # FLD AUTO: 10.77 K/UL — HIGH (ref 3.8–10.5)

## 2022-06-19 PROCEDURE — 93010 ELECTROCARDIOGRAM REPORT: CPT

## 2022-06-19 PROCEDURE — 99232 SBSQ HOSP IP/OBS MODERATE 35: CPT

## 2022-06-19 PROCEDURE — 99222 1ST HOSP IP/OBS MODERATE 55: CPT

## 2022-06-19 PROCEDURE — 99233 SBSQ HOSP IP/OBS HIGH 50: CPT

## 2022-06-19 RX ORDER — INSULIN LISPRO 100/ML
7 VIAL (ML) SUBCUTANEOUS
Refills: 0 | Status: DISCONTINUED | OUTPATIENT
Start: 2022-06-19 | End: 2022-06-20

## 2022-06-19 RX ORDER — METOPROLOL TARTRATE 50 MG
25 TABLET ORAL DAILY
Refills: 0 | Status: DISCONTINUED | OUTPATIENT
Start: 2022-06-20 | End: 2022-06-20

## 2022-06-19 RX ORDER — METOPROLOL TARTRATE 50 MG
12.5 TABLET ORAL EVERY 12 HOURS
Refills: 0 | Status: DISCONTINUED | OUTPATIENT
Start: 2022-06-19 | End: 2022-06-19

## 2022-06-19 RX ORDER — MAGNESIUM SULFATE 500 MG/ML
1 VIAL (ML) INJECTION ONCE
Refills: 0 | Status: COMPLETED | OUTPATIENT
Start: 2022-06-19 | End: 2022-06-19

## 2022-06-19 RX ORDER — INSULIN GLARGINE 100 [IU]/ML
21 INJECTION, SOLUTION SUBCUTANEOUS AT BEDTIME
Refills: 0 | Status: DISCONTINUED | OUTPATIENT
Start: 2022-06-19 | End: 2022-06-20

## 2022-06-19 RX ORDER — POTASSIUM CHLORIDE 20 MEQ
20 PACKET (EA) ORAL ONCE
Refills: 0 | Status: COMPLETED | OUTPATIENT
Start: 2022-06-19 | End: 2022-06-19

## 2022-06-19 RX ORDER — ACETAMINOPHEN 500 MG
650 TABLET ORAL ONCE
Refills: 0 | Status: COMPLETED | OUTPATIENT
Start: 2022-06-19 | End: 2022-06-19

## 2022-06-19 RX ADMIN — CHLORHEXIDINE GLUCONATE 1 APPLICATION(S): 213 SOLUTION TOPICAL at 06:13

## 2022-06-19 RX ADMIN — Medication 650 MILLIGRAM(S): at 13:27

## 2022-06-19 RX ADMIN — HEPARIN SODIUM 5000 UNIT(S): 5000 INJECTION INTRAVENOUS; SUBCUTANEOUS at 21:11

## 2022-06-19 RX ADMIN — ATORVASTATIN CALCIUM 80 MILLIGRAM(S): 80 TABLET, FILM COATED ORAL at 21:11

## 2022-06-19 RX ADMIN — Medication 7 UNIT(S): at 17:09

## 2022-06-19 RX ADMIN — TICAGRELOR 90 MILLIGRAM(S): 90 TABLET ORAL at 05:55

## 2022-06-19 RX ADMIN — Medication 100 GRAM(S): at 05:56

## 2022-06-19 RX ADMIN — HUMAN INSULIN 27 UNIT(S): 100 INJECTION, SUSPENSION SUBCUTANEOUS at 08:14

## 2022-06-19 RX ADMIN — HEPARIN SODIUM 5000 UNIT(S): 5000 INJECTION INTRAVENOUS; SUBCUTANEOUS at 05:56

## 2022-06-19 RX ADMIN — Medication 12.5 MILLIGRAM(S): at 17:09

## 2022-06-19 RX ADMIN — Medication 650 MILLIGRAM(S): at 12:57

## 2022-06-19 RX ADMIN — TICAGRELOR 90 MILLIGRAM(S): 90 TABLET ORAL at 17:09

## 2022-06-19 RX ADMIN — Medication 81 MILLIGRAM(S): at 12:32

## 2022-06-19 RX ADMIN — INSULIN GLARGINE 21 UNIT(S): 100 INJECTION, SOLUTION SUBCUTANEOUS at 21:19

## 2022-06-19 RX ADMIN — Medication 20 MILLIEQUIVALENT(S): at 05:56

## 2022-06-19 RX ADMIN — Medication 2: at 17:08

## 2022-06-19 RX ADMIN — Medication 8 UNIT(S): at 08:15

## 2022-06-19 NOTE — CONSULT NOTE ADULT - SUBJECTIVE AND OBJECTIVE BOX
HPI:  52 year old man with T1DM with suboptimal control (a1c 7.7%) on NPH BID and Humalog pre-meal, HLD, presenting with chest pain. Found to have STEMI, s/p PCI with stent. Endocrine consulted for T1DM mgmt.    Diagnosed with T1DM at around age 23. No known complications prior to current STEMI, UTD with optho. Follows with Endocrinologist, Dr. Cordoba in [place] but admits to only seeing once a year. Doesn't follow with a PMD. Takes NPH 27 units qAM, 20 units qhs, and humalog 8-14 units BID pre-meal (before breakfast and before dinner) with dose dependent on BG and estimation of carb count. Adherent with insulin. Checks FS with glucometer frequently and levels vary from 80s-200s with hypoglycemia about once a week overnight if eats light dinner or at lunch time if doesn't eat lunch. States diet is "not the best", eats bagel every day for breakfast and states he needs to eat high carb food for breakfast so glucose doesn't drop. Eats balanced carb consistent meals for lunch and dinner. No sugary drinks. Active with job (works in [employer]).   Reports decreased appetite, eating about 50% of meal.     (17 Jun 2022 16:21)      PAST MEDICAL & SURGICAL HISTORY:  Diabetes  Type 1 DM      H/O Spinal surgery  2000, compressed nerve          FAMILY HISTORY:  FH: coronary artery disease  Mother    FH: heart failure  Mother    +FH of DM in cousins on mother's side        Social History:  former smoker  rare ETOH    Outpatient Medications: Home Medications:  aspirin 81 mg oral tablet: 1 tab(s) orally once a day (17 Jun 2022 17:35)  HumaLOG 100 units/mL subcutaneous solution: 8 unit(s) subcutaneous 3 times a day (before meals) (17 Jun 2022 17:35)  HumuLIN 70/30 subcutaneous suspension: 27 unit(s) subcutaneous once a day in the AM (17 Jun 2022 17:35)  HumuLIN 70/30 subcutaneous suspension: 20 unit(s) subcutaneous once a day (at bedtime) (17 Jun 2022 17:35)  simvastatin 10 mg oral tablet: 1 tab(s) orally once a day (at bedtime) (17 Jun 2022 17:35)      MEDICATIONS  (STANDING):  aspirin  chewable 81 milliGRAM(s) Oral daily  atorvastatin 80 milliGRAM(s) Oral at bedtime  chlorhexidine 4% Liquid 1 Application(s) Topical <User Schedule>  dextrose 5%. 1000 milliLiter(s) (50 mL/Hr) IV Continuous <Continuous>  dextrose 5%. 1000 milliLiter(s) (100 mL/Hr) IV Continuous <Continuous>  heparin   Injectable 5000 Unit(s) SubCutaneous every 8 hours  insulin lispro (ADMELOG) corrective regimen sliding scale   SubCutaneous at bedtime  insulin lispro (ADMELOG) corrective regimen sliding scale   SubCutaneous three times a day before meals  insulin lispro Injectable (ADMELOG) 8 Unit(s) SubCutaneous three times a day before meals  insulin NPH human recombinant 27 Unit(s) SubCutaneous before breakfast  insulin NPH human recombinant 20 Unit(s) SubCutaneous at bedtime  ticagrelor 90 milliGRAM(s) Oral every 12 hours    MEDICATIONS  (PRN):      Allergies    No Known Allergies    Intolerances      Review of Systems:  Constitutional: No fever, chills   Neuro: No tremors, headache   Cardiovascular: No chest pain, palpitations  Respiratory: No SOB, no cough  GI: No nausea, vomiting, abdominal pain  : No dysuria, polyuria   Skin: no rash, ulcers   Psych: no depression, anxiety   Endocrine: no polyphagia, polydipsia     ALL OTHER SYSTEMS REVIEWED AND NEGATIVE        PHYSICAL EXAM:  VITALS: T(C): 37.1 (06-19-22 @ 07:00)  T(F): 98.8 (06-19-22 @ 07:00), Max: 99.2 (06-18-22 @ 20:00)  HR: 82 (06-19-22 @ 14:00) (55 - 82)  BP: 100/57 (06-19-22 @ 14:00) (90/55 - 114/59)  RR:  (13 - 23)  SpO2:  (94% - 97%)  Wt(kg): --  GENERAL: NAD, well-developed  EYES: No proptosis, anicteric  HEENT:  Atraumatic, Normocephalic, moist mucous membranes  THYROID: Normal size  RESPIRATORY: no respiratory distress, no accessory muscle use   GI: Soft, nontender, non distended  MSK: left foot wound dressed with boot  NEURO: AOx3, moves all extremities spontaneously         POCT Blood Glucose.: 80 mg/dL (06-19-22 @ 12:30)  POCT Blood Glucose.: 88 mg/dL (06-19-22 @ 08:08)  POCT Blood Glucose.: 233 mg/dL (06-18-22 @ 21:22)  POCT Blood Glucose.: 267 mg/dL (06-18-22 @ 12:13)  POCT Blood Glucose.: 260 mg/dL (06-18-22 @ 10:42)  POCT Blood Glucose.: 415 mg/dL (06-18-22 @ 08:13)  POCT Blood Glucose.: 223 mg/dL (06-17-22 @ 22:01)  POCT Blood Glucose.: 151 mg/dL (06-17-22 @ 19:46)  POCT Blood Glucose.: 130 mg/dL (06-17-22 @ 18:26)  POCT Blood Glucose.: 208 mg/dL (06-17-22 @ 14:17)                            11.5   10.77 )-----------( 214      ( 19 Jun 2022 04:44 )             34.9       06-19    139  |  105  |  16  ----------------------------<  103<H>  3.7   |  25  |  0.92    eGFR: 99    Ca    8.5      06-19  Mg     1.9     06-19  Phos  3.0     06-19    TPro  6.0  /  Alb  3.7  /  TBili  0.5  /  DBili  x   /  AST  90<H>  /  ALT  35  /  AlkPhos  54  06-19      Thyroid Function Tests:  06-17 @ 22:26 TSH 0.69 FreeT4 -- T3 -- Anti TPO -- Anti Thyroglobulin Ab -- TSI --      06-18 Chol 109 Direct LDL -- LDL calculated 58 HDL 42 Trig 44        Radiology:                HPI:  52 year old man with T1DM with suboptimal control (a1c 7.7%) on NPH BID and Humalog pre-meal, HLD, presenting with chest pain. Found to have STEMI, s/p PCI with stent. Endocrine consulted for T1DM mgmt.    Diagnosed with T1DM at around age 23. No known complications prior to current STEMI, UTD with optho. Follows with Endocrinologist, Dr. Cordoba in [place] but admits to only seeing once a year. Doesn't follow with a PMD. Takes NPH 27 units qAM, 20 units qhs, and humalog 8-14 units BID pre-meal (before breakfast and before dinner) with dose dependent on BG and estimation of carb count. Adherent with insulin. Checks FS with glucometer frequently and levels vary from 80s-200s with hypoglycemia about once a week overnight if eats light dinner or at lunch time if doesn't eat lunch. States diet is "not the best", eats bagel every day for breakfast and states he needs to eat high carb food for breakfast so glucose doesn't drop. Eats balanced carb consistent meals for lunch and dinner. No sugary drinks. Active with job (works in [employer]).   Reports decreased appetite, eating about 50% of meal.     (17 Jun 2022 16:21)      PAST MEDICAL & SURGICAL HISTORY:  Diabetes  Type 1 DM      H/O Spinal surgery  2000, compressed nerve          FAMILY HISTORY:  FH: coronary artery disease  Mother    FH: heart failure  Mother    +FH of DM in cousins on mother's side        Social History:  former smoker  rare ETOH    Outpatient Medications: Home Medications:  aspirin 81 mg oral tablet: 1 tab(s) orally once a day (17 Jun 2022 17:35)  HumaLOG 100 units/mL subcutaneous solution: 8 unit(s) subcutaneous 3 times a day (before meals) (17 Jun 2022 17:35)  HumuLIN 70/30 subcutaneous suspension: 27 unit(s) subcutaneous once a day in the AM (17 Jun 2022 17:35)  HumuLIN 70/30 subcutaneous suspension: 20 unit(s) subcutaneous once a day (at bedtime) (17 Jun 2022 17:35)  simvastatin 10 mg oral tablet: 1 tab(s) orally once a day (at bedtime) (17 Jun 2022 17:35)      MEDICATIONS  (STANDING):  aspirin  chewable 81 milliGRAM(s) Oral daily  atorvastatin 80 milliGRAM(s) Oral at bedtime  chlorhexidine 4% Liquid 1 Application(s) Topical <User Schedule>  dextrose 5%. 1000 milliLiter(s) (50 mL/Hr) IV Continuous <Continuous>  dextrose 5%. 1000 milliLiter(s) (100 mL/Hr) IV Continuous <Continuous>  heparin   Injectable 5000 Unit(s) SubCutaneous every 8 hours  insulin lispro (ADMELOG) corrective regimen sliding scale   SubCutaneous at bedtime  insulin lispro (ADMELOG) corrective regimen sliding scale   SubCutaneous three times a day before meals  insulin lispro Injectable (ADMELOG) 8 Unit(s) SubCutaneous three times a day before meals  insulin NPH human recombinant 27 Unit(s) SubCutaneous before breakfast  insulin NPH human recombinant 20 Unit(s) SubCutaneous at bedtime  ticagrelor 90 milliGRAM(s) Oral every 12 hours    MEDICATIONS  (PRN):      Allergies    No Known Allergies    Intolerances      Review of Systems:  Constitutional: No fever, chills   Neuro: No tremors, headache   Cardiovascular: No chest pain, palpitations  Respiratory: No SOB, no cough  GI: No nausea, vomiting, abdominal pain  : No dysuria, polyuria   Skin: no rash, ulcers   Psych: no depression, anxiety   Endocrine: no polyphagia, polydipsia     ALL OTHER SYSTEMS REVIEWED AND NEGATIVE        PHYSICAL EXAM:  VITALS: T(C): 37.1 (06-19-22 @ 07:00)  T(F): 98.8 (06-19-22 @ 07:00), Max: 99.2 (06-18-22 @ 20:00)  HR: 82 (06-19-22 @ 14:00) (55 - 82)  BP: 100/57 (06-19-22 @ 14:00) (90/55 - 114/59)  RR:  (13 - 23)  SpO2:  (94% - 97%)  Wt(kg): 83.5  GENERAL: NAD, well-developed  EYES: No proptosis, anicteric  HEENT:  Atraumatic, Normocephalic, moist mucous membranes  THYROID: Normal size  RESPIRATORY: no respiratory distress, no accessory muscle use   GI: Soft, nontender, non distended  MSK: left foot wound dressed with boot  NEURO: AOx3, moves all extremities spontaneously         POCT Blood Glucose.: 80 mg/dL (06-19-22 @ 12:30)  POCT Blood Glucose.: 88 mg/dL (06-19-22 @ 08:08)  POCT Blood Glucose.: 233 mg/dL (06-18-22 @ 21:22)  POCT Blood Glucose.: 267 mg/dL (06-18-22 @ 12:13)  POCT Blood Glucose.: 260 mg/dL (06-18-22 @ 10:42)  POCT Blood Glucose.: 415 mg/dL (06-18-22 @ 08:13)  POCT Blood Glucose.: 223 mg/dL (06-17-22 @ 22:01)  POCT Blood Glucose.: 151 mg/dL (06-17-22 @ 19:46)  POCT Blood Glucose.: 130 mg/dL (06-17-22 @ 18:26)  POCT Blood Glucose.: 208 mg/dL (06-17-22 @ 14:17)                            11.5   10.77 )-----------( 214      ( 19 Jun 2022 04:44 )             34.9       06-19    139  |  105  |  16  ----------------------------<  103<H>  3.7   |  25  |  0.92    eGFR: 99    Ca    8.5      06-19  Mg     1.9     06-19  Phos  3.0     06-19    TPro  6.0  /  Alb  3.7  /  TBili  0.5  /  DBili  x   /  AST  90<H>  /  ALT  35  /  AlkPhos  54  06-19      Thyroid Function Tests:  06-17 @ 22:26 TSH 0.69 FreeT4 -- T3 -- Anti TPO -- Anti Thyroglobulin Ab -- TSI --      06-18 Chol 109 Direct LDL -- LDL calculated 58 HDL 42 Trig 44        Radiology:

## 2022-06-19 NOTE — CONSULT NOTE ADULT - ASSESSMENT
52 year old man with T1DM with suboptimal control (a1c 7.7%) on NPH BID and Humalog pre-meal, HLD, presenting with chest pain. Found to have STEMI, s/p PCI with stent. Endocrine consulted for T1DM mgmt.    T1DM with suboptimal control (a1c 7.7%) now c/b by CAD, with variable glucose levels on current home regimen. With hyperglycemia yesterday likely exacerbated by stress. Most recent fasting and prandial glucose levels in tight control.  - goal glucose 100-180  - recommend to stop NPH BID and switch to Lantus qhs  - recommend Lantus 21 units qhs  - Admelog 7 units TID pre-meal  - c/w moderate pre-meal correction scale and low HS correction scale  - check FS AC/HS  - carb consistent diet  - registered dietician eval  Discharge  - Plan to DC on basal bolus insulin (if pt is amenable) (doses to be finalized). Outpatient f/u with patient's Endocrinologist.     #HTN  goal BP<130/80  - manage per primary team    #HLD  - continue statin    DW Team    Audrey Carson DO, Endocrine Fellow   Pager [phone] from 9-5PM. After hours and on weekends please call [phone].       52 year old man with T1DM with suboptimal control (a1c 7.7%) on NPH BID and Humalog pre-meal, HLD, presenting with chest pain. Found to have STEMI, s/p PCI with stent. Endocrine consulted for T1DM mgmt.    T1DM with suboptimal control (a1c 7.7%) now c/b by CAD, with variable glucose levels on current home regimen. With hyperglycemia yesterday likely exacerbated by stress. Most recent fasting and prandial glucose levels in tight control.  - goal glucose 100-180  - recommend to stop NPH BID and switch to Lantus qhs  - recommend Lantus 21 units qhs  - Admelog 7 units TID pre-meal  - c/w moderate pre-meal correction scale and low HS correction scale  - check FS AC/HS  - carb consistent diet  - registered dietician eval  Discharge  - Plan to DC on insulin, basal bolus insulin if pt is amenable versus NPH BID + humalog BID pre-meal which pt currently takes at home if he prefers to remain on this regimen (regimen and doses to be finalized). Outpatient f/u with patient's Endocrinologist.     #HTN  goal BP<130/80  - manage per primary team    #HLD  - continue statin    DW Team    Audrey Carson DO, Endocrine Fellow   Pager [phone] from 9-5PM. After hours and on weekends please call [phone].       52 year old man with T1DM with suboptimal control (a1c 7.7%) on NPH BID and Humalog pre-meal, HLD, presenting with chest pain. Found to have STEMI, s/p PCI with stent. Endocrine consulted for T1DM mgmt.    T1DM with suboptimal control (a1c 7.7%) now c/b by CAD, with variable glucose levels on current home regimen. With hyperglycemia yesterday likely exacerbated by stress. Most recent fasting and prandial glucose levels in tight control.  - goal glucose 100-180  - recommend to stop NPH BID and switch to Lantus qhs  - recommend Lantus 21 units qhs  - Admelog 7 units TID pre-meal  - c/w moderate pre-meal correction scale and low HS correction scale  - check FS AC/HS  - carb consistent diet  - registered dietician eval  Discharge  - Plan to DC on insulin, basal bolus insulin if pt is amenable versus NPH BID + humalog BID pre-meal which pt currently takes at home if he prefers to remain on this regimen (regimen and doses to be finalized). Outpatient f/u with patient's Endocrinologist.     #HTN  Blood pressure at goal  check urine alb/creatinine as outpt    #HLD  - continue statin    DW Team    Audrey Carson DO, Endocrine Fellow   Pager [phone] from 9-5PM. After hours and on weekends please call [phone].

## 2022-06-19 NOTE — PROGRESS NOTE ADULT - SUBJECTIVE AND OBJECTIVE BOX
PATIENT:  BRAD COLLINS  [id number]    CHIEF COMPLAINT:  Patient is a 52y old  Male who presents with a chief complaint of Inferior wall STEMI (18 Jun 2022 19:39)      INTERVAL HISTORYOVERNIGHT EVENTS:      REVIEW OF SYSTEMS:    Constitutional:     [ ] negative [ ] fevers [ ] chills [ ] weight loss [ ] weight gain  HEENT:                  [ ] negative [ ] dry eyes [ ] eye irritation [ ] postnasal drip [ ] nasal congestion  CV:                         [ ] negative  [ ] chest pain [ ] orthopnea [ ] palpitations [ ] murmur  Resp:                     [ ] negative [ ] cough [ ] shortness of breath [ ] dyspnea [ ] wheezing [ ] sputum [ ] hemoptysis  GI:                          [ ] negative [ ] nausea [ ] vomiting [ ] diarrhea [ ] constipation [ ] abd pain [ ] dysphagia   :                        [ ] negative [ ] dysuria [ ] nocturia [ ] hematuria [ ] increased urinary frequency  Musculoskeletal: [ ] negative [ ] back pain [ ] myalgias [ ] arthralgias [ ] fracture  Skin:                       [ ] negative [ ] rash [ ] itch  Neurological:        [ ] negative [ ] headache [ ] dizziness [ ] syncope [ ] weakness [ ] numbness  Psychiatric:           [ ] negative [ ] anxiety [ ] depression  Endocrine:            [ ] negative [ ] diabetes [ ] thyroid problem  Heme/Lymph:      [ ] negative [ ] anemia [ ] bleeding problem  Allergic/Immune: [ ] negative [ ] itchy eyes [ ] nasal discharge [ ] hives [ ] angioedema    [ ] All other systems negative  [ ] Unable to assess ROS because ________.    MEDICATIONS:  MEDICATIONS  (STANDING):  aspirin  chewable 81 milliGRAM(s) Oral daily  atorvastatin 80 milliGRAM(s) Oral at bedtime  chlorhexidine 4% Liquid 1 Application(s) Topical <User Schedule>  dextrose 5%. 1000 milliLiter(s) (50 mL/Hr) IV Continuous <Continuous>  dextrose 5%. 1000 milliLiter(s) (100 mL/Hr) IV Continuous <Continuous>  heparin   Injectable 5000 Unit(s) SubCutaneous every 8 hours  insulin lispro (ADMELOG) corrective regimen sliding scale   SubCutaneous at bedtime  insulin lispro (ADMELOG) corrective regimen sliding scale   SubCutaneous three times a day before meals  insulin lispro Injectable (ADMELOG) 8 Unit(s) SubCutaneous three times a day before meals  insulin NPH human recombinant 27 Unit(s) SubCutaneous before breakfast  insulin NPH human recombinant 20 Unit(s) SubCutaneous at bedtime  ticagrelor 90 milliGRAM(s) Oral every 12 hours    MEDICATIONS  (PRN):      ALLERGIES:  Allergies    No Known Allergies    Intolerances        OBJECTIVE:  ICU Vital Signs Last 24 Hrs  T(C): 37.1 (19 Jun 2022 07:00), Max: 37.3 (18 Jun 2022 20:00)  T(F): 98.8 (19 Jun 2022 07:00), Max: 99.2 (18 Jun 2022 20:00)  HR: 73 (19 Jun 2022 12:00) (55 - 79)  BP: 112/56 (19 Jun 2022 12:00) (90/55 - 114/59)  BP(mean): 79 (19 Jun 2022 12:00) (64 - 79)  ABP: --  ABP(mean): --  RR: 19 (19 Jun 2022 12:00) (13 - 23)  SpO2: 97% (19 Jun 2022 12:00) (94% - 97%)      Adult Advanced Hemodynamics Last 24 Hrs  CVP(mm Hg): --  CVP(cm H2O): --  CO: --  CI: --  PA: --  PA(mean): --  PCWP: --  SVR: --  SVRI: --  PVR: --  PVRI: --  CAPILLARY BLOOD GLUCOSE      POCT Blood Glucose.: 80 mg/dL (19 Jun 2022 12:30)  POCT Blood Glucose.: 88 mg/dL (19 Jun 2022 08:08)  POCT Blood Glucose.: 233 mg/dL (18 Jun 2022 21:22)    CAPILLARY BLOOD GLUCOSE      POCT Blood Glucose.: 80 mg/dL (19 Jun 2022 12:30)    I&O's Summary    18 Jun 2022 07:01  -  19 Jun 2022 07:00  --------------------------------------------------------  IN: 1200 mL / OUT: 375 mL / NET: 825 mL    19 Jun 2022 07:01  -  19 Jun 2022 12:57  --------------------------------------------------------  IN: 360 mL / OUT: 0 mL / NET: 360 mL      Daily     Daily     PHYSICAL EXAMINATION:  General: WN/WD NAD  HEENT: PERRLA, EOMI, moist mucous membranes  Neurology: A&Ox3, nonfocal, BEJARANO x 4  Respiratory: CTA B/L, normal respiratory effort, no wheezes, crackles, rales  CV: RRR, S1S2, no murmurs, rubs or gallops  Abdominal: Soft, NT, ND +BS, Last BM  Extremities: No edema, + peripheral pulses  Incisions:   Tubes:    LABS:                          11.5   10.77 )-----------( 214      ( 19 Jun 2022 04:44 )             34.9     06-19    139  |  105  |  16  ----------------------------<  103<H>  3.7   |  25  |  0.92    Ca    8.5      19 Jun 2022 04:44  Phos  3.0     06-19  Mg     1.9     06-19    TPro  6.0  /  Alb  3.7  /  TBili  0.5  /  DBili  x   /  AST  90<H>  /  ALT  35  /  AlkPhos  54  06-19    LIVER FUNCTIONS - ( 19 Jun 2022 04:44 )  Alb: 3.7 g/dL / Pro: 6.0 g/dL / ALK PHOS: 54 U/L / ALT: 35 U/L / AST: 90 U/L / GGT: x           PT/INR - ( 18 Jun 2022 04:34 )   PT: 14.0 sec;   INR: 1.20 ratio         PTT - ( 18 Jun 2022 04:34 )  PTT:28.5 sec    CARDIAC MARKERS ( 18 Jun 2022 04:34 )  x     / x     / 1826 U/L / x     / 99.9 ng/mL  CARDIAC MARKERS ( 17 Jun 2022 22:26 )  x     / x     / 2174 U/L / x     / 130.2 ng/mL        ====================ASSESSMENT ==============  52 year old man with diabetes, family history of CAD and CHF, presented with inferior wall STEMI, s/p 2 EVERETT to RCA, transferred to CCU for further management.       Plan:  ====================== NEUROLOGY=====================  AOx3, no active issues  - neuro checks per protocol  - encourage plenty of ambulation     ==================== RESPIRATORY======================  No active issues spO2 > 92% on RA     ====================CARDIOVASCULAR==================  IWSTEMI  - s/p LHC with EVERETT x2 to 100% RCA. Some intra procedural AIVR with hypotension req. lidocaine  - cont DAPT w/ aspirin and Brilinta  - c/w high intensity statin  - eventual initiation of GDMT w/ BB once BP improves   - TTE 6/18: EF 60-65%, normal LVSF w/ basal inferior akinesis       ===================HEMATOLOGIC/ONC ===================  - heparin subQ for DVT ppx   - CBC stable       aspirin  chewable 81 milliGRAM(s) Oral daily  heparin   Injectable 5000 Unit(s) SubCutaneous every 8 hours  ticagrelor 90 milliGRAM(s) Oral every 12 hours    ===================== RENAL =========================  No active issues  - Trend BUN/SCr  - trend BMP daily, monitor Strict Is&Os  - Keep K > 4, Mg > 2    ==================== GASTROINTESTINAL===================  No active issues  - DASH/TLC/Consistent carb diet     +BM 6/18       dextrose 5%. 1000 milliLiter(s) (50 mL/Hr) IV Continuous <Continuous>  dextrose 5%. 1000 milliLiter(s) (100 mL/Hr) IV Continuous <Continuous>  sodium phosphate IVPB 15 milliMole(s) IV Intermittent once    =======================    ENDOCRINE  =====================  hx of Diabetes, type 1 (A1C 7.7)   - cont Humulin 27AM/20PM, resume premeal home insulin dose 8U  with sliding scale  - Endo to see pt 6/19     atorvastatin 80 milliGRAM(s) Oral at bedtime  insulin lispro (ADMELOG) corrective regimen sliding scale   SubCutaneous at bedtime  insulin lispro (ADMELOG) corrective regimen sliding scale   SubCutaneous three times a day before meals  insulin lispro Injectable (ADMELOG) 8 Unit(s) SubCutaneous three times a day before meals  insulin NPH human recombinant 27 Unit(s) SubCutaneous before breakfast  insulin NPH human recombinant 20 Unit(s) SubCutaneous at bedtime    ========================INFECTIOUS DISEASE================  No active issues  - trend CBC and fever curve, monitor off abx     PATIENT:  BRAD COLLINS  [id number]    CHIEF COMPLAINT:  Patient is a 52y old  Male who presents with a chief complaint of Inferior wall STEMI (18 Jun 2022 19:39)      INTERVAL HISTORYOVERNIGHT EVENTS:      REVIEW OF SYSTEMS:    Constitutional:     [ ] negative [ ] fevers [ ] chills [ ] weight loss [ ] weight gain  HEENT:                  [ ] negative [ ] dry eyes [ ] eye irritation [ ] postnasal drip [ ] nasal congestion  CV:                         [ ] negative  [ ] chest pain [ ] orthopnea [ ] palpitations [ ] murmur  Resp:                     [ ] negative [ ] cough [ ] shortness of breath [ ] dyspnea [ ] wheezing [ ] sputum [ ] hemoptysis  GI:                          [ ] negative [ ] nausea [ ] vomiting [ ] diarrhea [ ] constipation [ ] abd pain [ ] dysphagia   :                        [ ] negative [ ] dysuria [ ] nocturia [ ] hematuria [ ] increased urinary frequency  Musculoskeletal: [ ] negative [ ] back pain [ ] myalgias [ ] arthralgias [ ] fracture  Skin:                       [ ] negative [ ] rash [ ] itch  Neurological:        [ ] negative [ ] headache [ ] dizziness [ ] syncope [ ] weakness [ ] numbness  Psychiatric:           [ ] negative [ ] anxiety [ ] depression  Endocrine:            [ ] negative [ ] diabetes [ ] thyroid problem  Heme/Lymph:      [ ] negative [ ] anemia [ ] bleeding problem  Allergic/Immune: [ ] negative [ ] itchy eyes [ ] nasal discharge [ ] hives [ ] angioedema    [ ] All other systems negative  [ ] Unable to assess ROS because ________.    MEDICATIONS:  MEDICATIONS  (STANDING):  aspirin  chewable 81 milliGRAM(s) Oral daily  atorvastatin 80 milliGRAM(s) Oral at bedtime  chlorhexidine 4% Liquid 1 Application(s) Topical <User Schedule>  dextrose 5%. 1000 milliLiter(s) (50 mL/Hr) IV Continuous <Continuous>  dextrose 5%. 1000 milliLiter(s) (100 mL/Hr) IV Continuous <Continuous>  heparin   Injectable 5000 Unit(s) SubCutaneous every 8 hours  insulin lispro (ADMELOG) corrective regimen sliding scale   SubCutaneous at bedtime  insulin lispro (ADMELOG) corrective regimen sliding scale   SubCutaneous three times a day before meals  insulin lispro Injectable (ADMELOG) 8 Unit(s) SubCutaneous three times a day before meals  insulin NPH human recombinant 27 Unit(s) SubCutaneous before breakfast  insulin NPH human recombinant 20 Unit(s) SubCutaneous at bedtime  ticagrelor 90 milliGRAM(s) Oral every 12 hours    MEDICATIONS  (PRN):      ALLERGIES:  Allergies    No Known Allergies    Intolerances        OBJECTIVE:  ICU Vital Signs Last 24 Hrs  T(C): 37.1 (19 Jun 2022 07:00), Max: 37.3 (18 Jun 2022 20:00)  T(F): 98.8 (19 Jun 2022 07:00), Max: 99.2 (18 Jun 2022 20:00)  HR: 73 (19 Jun 2022 12:00) (55 - 79)  BP: 112/56 (19 Jun 2022 12:00) (90/55 - 114/59)  BP(mean): 79 (19 Jun 2022 12:00) (64 - 79)  ABP: --  ABP(mean): --  RR: 19 (19 Jun 2022 12:00) (13 - 23)  SpO2: 97% (19 Jun 2022 12:00) (94% - 97%)      Adult Advanced Hemodynamics Last 24 Hrs  CVP(mm Hg): --  CVP(cm H2O): --  CO: --  CI: --  PA: --  PA(mean): --  PCWP: --  SVR: --  SVRI: --  PVR: --  PVRI: --  CAPILLARY BLOOD GLUCOSE      POCT Blood Glucose.: 80 mg/dL (19 Jun 2022 12:30)  POCT Blood Glucose.: 88 mg/dL (19 Jun 2022 08:08)  POCT Blood Glucose.: 233 mg/dL (18 Jun 2022 21:22)    CAPILLARY BLOOD GLUCOSE      POCT Blood Glucose.: 80 mg/dL (19 Jun 2022 12:30)    I&O's Summary    18 Jun 2022 07:01  -  19 Jun 2022 07:00  --------------------------------------------------------  IN: 1200 mL / OUT: 375 mL / NET: 825 mL    19 Jun 2022 07:01  -  19 Jun 2022 12:57  --------------------------------------------------------  IN: 360 mL / OUT: 0 mL / NET: 360 mL      Daily     Daily     PHYSICAL EXAMINATION:  General: WN/WD NAD  HEENT: EOMI, moist mucous membranes  Neurology: A&Ox3, nonfocal, BEJARANO x 4  Respiratory: CTA B/L, normal respiratory effort, no wheezes, crackles, rales  CV: RRR, S1S2, no murmurs, rubs or gallops  Abdominal: Soft, NT, ND +BS, Last BM  Extremities: No edema, + peripheral pulses  Incisions:   Tubes:    LABS:                          11.5   10.77 )-----------( 214      ( 19 Jun 2022 04:44 )             34.9     06-19    139  |  105  |  16  ----------------------------<  103<H>  3.7   |  25  |  0.92    Ca    8.5      19 Jun 2022 04:44  Phos  3.0     06-19  Mg     1.9     06-19    TPro  6.0  /  Alb  3.7  /  TBili  0.5  /  DBili  x   /  AST  90<H>  /  ALT  35  /  AlkPhos  54  06-19    LIVER FUNCTIONS - ( 19 Jun 2022 04:44 )  Alb: 3.7 g/dL / Pro: 6.0 g/dL / ALK PHOS: 54 U/L / ALT: 35 U/L / AST: 90 U/L / GGT: x           PT/INR - ( 18 Jun 2022 04:34 )   PT: 14.0 sec;   INR: 1.20 ratio         PTT - ( 18 Jun 2022 04:34 )  PTT:28.5 sec    CARDIAC MARKERS ( 18 Jun 2022 04:34 )  x     / x     / 1826 U/L / x     / 99.9 ng/mL  CARDIAC MARKERS ( 17 Jun 2022 22:26 )  x     / x     / 2174 U/L / x     / 130.2 ng/mL        ====================ASSESSMENT ==============  52 year old man with diabetes, family history of CAD and CHF, presented with inferior wall STEMI, s/p 2 EVERETT to RCA, transferred to CCU for further management.       Plan:  ====================== NEUROLOGY=====================  AOx3, no active issues  - neuro checks per protocol  - encourage plenty of ambulation     ==================== RESPIRATORY======================  No active issues spO2 > 92% on RA     ====================CARDIOVASCULAR==================  IWSTEMI  - s/p LHC with EVERETT x2 to 100% RCA. Some intra procedural AIVR with hypotension req. lidocaine  - cont DAPT w/ aspirin and Brilinta  - c/w high intensity statin  - eventual initiation of GDMT w/ BB once BP improves   - TTE 6/18: EF 60-65%, normal LVSF w/ basal inferior akinesis       ===================HEMATOLOGIC/ONC ===================  - heparin subQ for DVT ppx   - CBC stable       aspirin  chewable 81 milliGRAM(s) Oral daily  heparin   Injectable 5000 Unit(s) SubCutaneous every 8 hours  ticagrelor 90 milliGRAM(s) Oral every 12 hours    ===================== RENAL =========================  No active issues  - Trend BUN/SCr  - trend BMP daily, monitor Strict Is&Os  - Keep K > 4, Mg > 2    ==================== GASTROINTESTINAL===================  No active issues  - DASH/TLC/Consistent carb diet     +BM 6/18       dextrose 5%. 1000 milliLiter(s) (50 mL/Hr) IV Continuous <Continuous>  dextrose 5%. 1000 milliLiter(s) (100 mL/Hr) IV Continuous <Continuous>  sodium phosphate IVPB 15 milliMole(s) IV Intermittent once    =======================    ENDOCRINE  =====================  hx of Diabetes, type 1 (A1C 7.7)   - cont Humulin 27AM/20PM, resume premeal home insulin dose 8U  with sliding scale  - Endo to see pt 6/19     atorvastatin 80 milliGRAM(s) Oral at bedtime  insulin lispro (ADMELOG) corrective regimen sliding scale   SubCutaneous at bedtime  insulin lispro (ADMELOG) corrective regimen sliding scale   SubCutaneous three times a day before meals  insulin lispro Injectable (ADMELOG) 8 Unit(s) SubCutaneous three times a day before meals  insulin NPH human recombinant 27 Unit(s) SubCutaneous before breakfast  insulin NPH human recombinant 20 Unit(s) SubCutaneous at bedtime    ========================INFECTIOUS DISEASE================  No active issues  - trend CBC and fever curve, monitor off abx

## 2022-06-19 NOTE — CONSULT NOTE ADULT - ATTENDING COMMENTS
Pt with T1DM, on NPH before breakfast and at bedtime and humalog before breakfast and dinner, with suboptimal control (A1c 7.7)  Recommend basal bolus insulin while inpatient, starting doses as above  Counseled pt on potential benefits of basal bolus vs current regimen, including decreased hypoglycemia if he skips lunch or has eratic meal timing  He will consider, but is very hesitant to change regimens.  May discharge on preadmission regimen with adjusted doses if he is not amenable to basal bolus at time of discharge  He should follow up with his endocrinologist post discharge.

## 2022-06-19 NOTE — PROGRESS NOTE ADULT - ASSESSMENT
Sheng is hemodynamically stable status post acute ST elevation myocardial infarction with primary intervention including 2 drug-eluting stents to the right coronary artery.  He had some AI VR now without dysrhythmias.  He has no evidence for heart failure.  We did extensive discussion concerning the etiology, evaluation, and management of his disease    Recommend    Continue ICU monitoring    Continue dual antiplatelet therapy    Continue statin therapy    BB    Cardiovascular risk reduction counseling done    Further management will be dependent on his clinical course

## 2022-06-19 NOTE — PROGRESS NOTE ADULT - ASSESSMENT
====================ASSESSMENT ==============  52 year old man with diabetes, family history of CAD and CHF, presented with inferior wall STEMI, s/p 2 EVERETT to RCA, transferred to CCU for further management.       Plan:  ====================== NEUROLOGY=====================  AOx3, no active issues  - neuro checks per protocol  - encourage plenty of ambulation     ==================== RESPIRATORY======================  No active issues spO2 > 92% on RA     ====================CARDIOVASCULAR==================  IWSTEMI  - s/p LHC with EVERETT x2 to 100% RCA. Some intra procedural AIVR with hypotension req. lidocaine  - cont DAPT w/ aspirin and Brilinta  - c/w high intensity statin  - TTE 6/18: EF 60-65%, normal LVSF w/ basal inferior akinesis   - cont low dose lopressor,  consider transitioning to toprol prior to dc   - CE have downtrended  - possible DC home in AM     ===================HEMATOLOGIC/ONC ===================  - heparin subQ for DVT ppx   - CBC stable         ===================== RENAL =========================  No active issues  - Trend BUN/SCr  - trend BMP daily, monitor Strict Is&Os  - Keep K > 4, Mg > 2    ==================== GASTROINTESTINAL===================  No active issues  - DASH/TLC/Consistent carb diet     +BM 6/18       =======================    ENDOCRINE  =====================  hx of Diabetes, type 1 (A1C 7.7)   - transitioned to lantus / premeal per endo   - monitor FS per protocol       ========================INFECTIOUS DISEASE================  No active issues  - trend CBC and fever curve, monitor off abx

## 2022-06-19 NOTE — PROGRESS NOTE ADULT - NS ATTEND AMEND GEN_ALL_CORE FT
53 yo man with DM1, HLD, presented with inf wall MI to the OSH s/p PCI with EVERETT to the RCA, intraprocedural AIVR requiring lidocaine some hypotension    Continue DAPT  High intensity statin   persistent hyperglycemia   endo consulted for management    DVT with SQH, H/H stable   still bradycardic and low BPs thus OMT still on hold for now   OOB
51 yo man with DM1, HLD, presented with inf wall MI to the OSH s/p PCI with EVERETT to the RCA, intraprocedural AIVR requiring lidocaine some hypotension    Continue DAPT  High intensity statin   TTE pending   hyperglycemia will adjust coverage accordingly   may need endo following if he remains hyperglycemic while in the hospital   DVT with SQH, H/H relatively stable   still bradycardic and low BPs thus OMT still on hold for now   OOB

## 2022-06-19 NOTE — PROGRESS NOTE ADULT - SUBJECTIVE AND OBJECTIVE BOX
Follow up: MI    HPI:  52 year old man with type 1 DM on Humulin BID/Humalog pre-meal, HLD on simvastatin, presented to OSH on 6/17 with severe chest pain in the center of his chest, associated with nausea. 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 Patient admits to heavier than usual drinking last night, does not drink daily, according to wife. He was a previous smoker, quit 6 months ago, smoked for >20 years prior.     EMS found patient with a junctional bradycardia, HR in the 30s, atropine given.  given. Patient brought to [facility].    In [place] ED, patient EKG significant for ST elevations in III, AVF, lead II slight elevation; reciprocal changes in V4-V6, HR normalized to 70s.  Patient given 325 aspirin and 600 Plavix. Patient subsequently received Brilinta 180 as well as heparin bolus. Patient transferred ot [facility] for PCI.    At [facility], patient received 2 stents in RCA for 100% stenosis. During procedure, patient with ectopic AVIR, received lidocaine 50x2. EF 50% noted during procedure.       Awake and alert this morning feeling much better.  He has no complaints.  His blood pressure is reasonable.      PAST MEDICAL & SURGICAL HISTORY:  Diabetes  Type 1 DM      H/O Spinal surgery  2000, compressed nerve          MEDICATIONS  (STANDING):  aspirin  chewable 81 milliGRAM(s) Oral daily  atorvastatin 80 milliGRAM(s) Oral at bedtime  chlorhexidine 4% Liquid 1 Application(s) Topical <User Schedule>  heparin   Injectable 5000 Unit(s) SubCutaneous every 8 hours  insulin glargine Injectable (LANTUS) 21 Unit(s) SubCutaneous at bedtime  insulin lispro (ADMELOG) corrective regimen sliding scale   SubCutaneous three times a day before meals  insulin lispro (ADMELOG) corrective regimen sliding scale   SubCutaneous at bedtime  insulin lispro Injectable (ADMELOG) 7 Unit(s) SubCutaneous three times a day before meals  metoprolol tartrate 12.5 milliGRAM(s) Oral every 12 hours  ticagrelor 90 milliGRAM(s) Oral every 12 hours    Vital Signs Last 24 Hrs  T(C): 37.1 (19 Jun 2022 19:00), Max: 37.3 (19 Jun 2022 04:00)  T(F): 98.7 (19 Jun 2022 19:00), Max: 99.2 (19 Jun 2022 04:00)  HR: 70 (19 Jun 2022 20:00) (59 - 82)  BP: 111/57 (19 Jun 2022 19:00) (90/55 - 118/64)  BP(mean): 77 (19 Jun 2022 19:00) (65 - 85)  RR: 18 (19 Jun 2022 19:00) (13 - 23)  SpO2: 97% (19 Jun 2022 19:00) (95% - 97%)    I&O's Summary    18 Jun 2022 07:01  -  19 Jun 2022 07:00  --------------------------------------------------------  IN: 1200 mL / OUT: 375 mL / NET: 825 mL    19 Jun 2022 07:01  -  19 Jun 2022 20:02  --------------------------------------------------------  IN: 600 mL / OUT: 550 mL / NET: 50 mL        PHYSICAL EXAM:    Constitutional: NAD, awake and alert, well-developed  Eyes:   Pupils round, no lesions  ENMT: no exudate or erythema  Pulmonary:  breath sounds are clear bilaterally, No wheezing, rales or rhonchi  Cardiovascular: PMI not palpable RRR normal S1 and S2, no murmurs, rubs, gallops or clicks  Gastrointestinal: Bowel Sounds present, soft, nontender.   Lymph: No cervical lymphadenopathy.  Neurological: Alert, no focal deficits  Skin: No rashes.  No cyanosis.  Psych:  Mood & affect appropriate   Ext: No lower ext edema      CARDIAC MARKERS ( 18 Jun 2022 04:34 )  x     / x     / 1826 U/L / x     / 99.9 ng/mL  CARDIAC MARKERS ( 17 Jun 2022 22:26 )  x     / x     / 2174 U/L / x     / 130.2 ng/mL                            11.5   10.77 )-----------( 214      ( 19 Jun 2022 04:44 )             34.9     06-19    139  |  105  |  16  ----------------------------<  103<H>  3.7   |  25  |  0.92    Ca    8.5      19 Jun 2022 04:44  Phos  3.0     06-19  Mg     1.9     06-19    TPro  6.0  /  Alb  3.7  /  TBili  0.5  /  DBili  x   /  AST  90<H>  /  ALT  35  /  AlkPhos  54  06-19      < from: TTE with Doppler (w/3D Echo) (Transthoracic Echocardiogram) (06.18.22 @ 07:40) >    Patient name: BRAD COLLINS  YOB: 1970   Age: 52 (M)   MR#: [id number]  Study Date: 6/18/2022  [facility] RDCS  Study quality: Technically good  Referring Physician: Zuhair Clarke MD  Blood Pressure: 101/53 mmHg  Height: 173 cm  Weight: 86 kg  BSA: 2 m2  Heart Rhythm: Normal sinus  Heart Rate: 68 mmHg  ------------------------------------------------------------------------  PROCEDURE: Transthoracic echocardiogram with 2-D, M-Mode  and complete spectral and color flow Doppler. Real-time and  reconstructed 3-dimensional imaging was performed.  Color  Doppler analysis was carried out.  INDICATION: ST elevation (STEMI) myocardial infarction  involving other coronary artery of inferior wall (I21.19)  ------------------------------------------------------------------------  Dimensions:    Normal Values:  LA:     3.4    2.0 - 4.0 cm  Ao:     2.7    2.0 - 3.8 cm  SEPTUM: 1.0    0.6 - 1.2 cm  PWT:    0.9    0.6 - 1.1 cm  LVIDd:  4.5    3.0- 5.6 cm  LVIDs:  2.7    1.8 - 4.0 cm  Derived variables:  LVMI: 71 g/m2  RWT: 0.40  EF (Visual Estimate): 60-65 %  ------------------------------------------------------------------------  Observations:  Mitral Valve: Normal mitral valve.  Aortic Valve/Aorta: Normal aortic valve.  Normal aortic root size.  Left Atrium: Normal left atrium.  Left Ventricle: Normal left ventricular internal dimensions  and wall thicknesses.  Normal left ventricular systolic function, with basal  inferior akinesis.  Normal diastolic function.  Right Heart: Normal right atrium. Normal right ventricular  size and function.  Normal tricuspid valve. Normal pulmonic valve.  Pericardium/Pleura: Normal pericardium with no pericardial  effusion.  Hemodynamic: Estimated right atrial pressure is normal.  No evidence of pulmonary hypertension.  ------------------------------------------------------------------------  Conclusions:  Normal left ventricular systolic function, with basal  inferior akinesis.  ------------------------------------------------------------------------  Confirmed on  6/18/2022 - 12:11:28 by Devin Bennett MD, FASE  ------------------------------------------------------------------------    < end of copied text >

## 2022-06-20 ENCOUNTER — TRANSCRIPTION ENCOUNTER (OUTPATIENT)
Age: 52
End: 2022-06-20

## 2022-06-20 VITALS
HEART RATE: 68 BPM | RESPIRATION RATE: 26 BRPM | DIASTOLIC BLOOD PRESSURE: 65 MMHG | OXYGEN SATURATION: 98 % | SYSTOLIC BLOOD PRESSURE: 110 MMHG

## 2022-06-20 LAB
ALBUMIN SERPL ELPH-MCNC: 3.7 G/DL — SIGNIFICANT CHANGE UP (ref 3.3–5)
ALP SERPL-CCNC: 57 U/L — SIGNIFICANT CHANGE UP (ref 40–120)
ALT FLD-CCNC: 33 U/L — SIGNIFICANT CHANGE UP (ref 10–45)
ANION GAP SERPL CALC-SCNC: 9 MMOL/L — SIGNIFICANT CHANGE UP (ref 5–17)
AST SERPL-CCNC: 52 U/L — HIGH (ref 10–40)
BASOPHILS # BLD AUTO: 0.05 K/UL — SIGNIFICANT CHANGE UP (ref 0–0.2)
BASOPHILS NFR BLD AUTO: 0.5 % — SIGNIFICANT CHANGE UP (ref 0–2)
BILIRUB SERPL-MCNC: 0.4 MG/DL — SIGNIFICANT CHANGE UP (ref 0.2–1.2)
BUN SERPL-MCNC: 11 MG/DL — SIGNIFICANT CHANGE UP (ref 7–23)
CALCIUM SERPL-MCNC: 8.8 MG/DL — SIGNIFICANT CHANGE UP (ref 8.4–10.5)
CHLORIDE SERPL-SCNC: 107 MMOL/L — SIGNIFICANT CHANGE UP (ref 96–108)
CO2 SERPL-SCNC: 25 MMOL/L — SIGNIFICANT CHANGE UP (ref 22–31)
CREAT SERPL-MCNC: 0.95 MG/DL — SIGNIFICANT CHANGE UP (ref 0.5–1.3)
EGFR: 95 ML/MIN/1.73M2 — SIGNIFICANT CHANGE UP
EOSINOPHIL # BLD AUTO: 0.18 K/UL — SIGNIFICANT CHANGE UP (ref 0–0.5)
EOSINOPHIL NFR BLD AUTO: 1.9 % — SIGNIFICANT CHANGE UP (ref 0–6)
GLUCOSE BLDC GLUCOMTR-MCNC: 174 MG/DL — HIGH (ref 70–99)
GLUCOSE BLDC GLUCOMTR-MCNC: 244 MG/DL — HIGH (ref 70–99)
GLUCOSE BLDC GLUCOMTR-MCNC: 67 MG/DL — LOW (ref 70–99)
GLUCOSE BLDC GLUCOMTR-MCNC: 89 MG/DL — SIGNIFICANT CHANGE UP (ref 70–99)
GLUCOSE SERPL-MCNC: 70 MG/DL — SIGNIFICANT CHANGE UP (ref 70–99)
HCT VFR BLD CALC: 36.3 % — LOW (ref 39–50)
HGB BLD-MCNC: 11.9 G/DL — LOW (ref 13–17)
IMM GRANULOCYTES NFR BLD AUTO: 0.4 % — SIGNIFICANT CHANGE UP (ref 0–1.5)
LYMPHOCYTES # BLD AUTO: 2.47 K/UL — SIGNIFICANT CHANGE UP (ref 1–3.3)
LYMPHOCYTES # BLD AUTO: 26.6 % — SIGNIFICANT CHANGE UP (ref 13–44)
MAGNESIUM SERPL-MCNC: 2 MG/DL — SIGNIFICANT CHANGE UP (ref 1.6–2.6)
MCHC RBC-ENTMCNC: 28.3 PG — SIGNIFICANT CHANGE UP (ref 27–34)
MCHC RBC-ENTMCNC: 32.8 GM/DL — SIGNIFICANT CHANGE UP (ref 32–36)
MCV RBC AUTO: 86.2 FL — SIGNIFICANT CHANGE UP (ref 80–100)
MONOCYTES # BLD AUTO: 0.96 K/UL — HIGH (ref 0–0.9)
MONOCYTES NFR BLD AUTO: 10.4 % — SIGNIFICANT CHANGE UP (ref 2–14)
NEUTROPHILS # BLD AUTO: 5.57 K/UL — SIGNIFICANT CHANGE UP (ref 1.8–7.4)
NEUTROPHILS NFR BLD AUTO: 60.2 % — SIGNIFICANT CHANGE UP (ref 43–77)
NRBC # BLD: 0 /100 WBCS — SIGNIFICANT CHANGE UP (ref 0–0)
PHOSPHATE SERPL-MCNC: 3.7 MG/DL — SIGNIFICANT CHANGE UP (ref 2.5–4.5)
PLATELET # BLD AUTO: 219 K/UL — SIGNIFICANT CHANGE UP (ref 150–400)
POTASSIUM SERPL-MCNC: 3.7 MMOL/L — SIGNIFICANT CHANGE UP (ref 3.5–5.3)
POTASSIUM SERPL-SCNC: 3.7 MMOL/L — SIGNIFICANT CHANGE UP (ref 3.5–5.3)
PROT SERPL-MCNC: 6.3 G/DL — SIGNIFICANT CHANGE UP (ref 6–8.3)
RBC # BLD: 4.21 M/UL — SIGNIFICANT CHANGE UP (ref 4.2–5.8)
RBC # FLD: 12.9 % — SIGNIFICANT CHANGE UP (ref 10.3–14.5)
SODIUM SERPL-SCNC: 141 MMOL/L — SIGNIFICANT CHANGE UP (ref 135–145)
WBC # BLD: 9.27 K/UL — SIGNIFICANT CHANGE UP (ref 3.8–10.5)
WBC # FLD AUTO: 9.27 K/UL — SIGNIFICANT CHANGE UP (ref 3.8–10.5)

## 2022-06-20 PROCEDURE — C1887: CPT

## 2022-06-20 PROCEDURE — 82962 GLUCOSE BLOOD TEST: CPT

## 2022-06-20 PROCEDURE — 80053 COMPREHEN METABOLIC PANEL: CPT

## 2022-06-20 PROCEDURE — 76376 3D RENDER W/INTRP POSTPROCES: CPT

## 2022-06-20 PROCEDURE — 85025 COMPLETE CBC W/AUTO DIFF WBC: CPT

## 2022-06-20 PROCEDURE — 36415 COLL VENOUS BLD VENIPUNCTURE: CPT

## 2022-06-20 PROCEDURE — 80061 LIPID PANEL: CPT

## 2022-06-20 PROCEDURE — 85730 THROMBOPLASTIN TIME PARTIAL: CPT

## 2022-06-20 PROCEDURE — 86850 RBC ANTIBODY SCREEN: CPT

## 2022-06-20 PROCEDURE — 99152 MOD SED SAME PHYS/QHP 5/>YRS: CPT

## 2022-06-20 PROCEDURE — C1725: CPT

## 2022-06-20 PROCEDURE — 83735 ASSAY OF MAGNESIUM: CPT

## 2022-06-20 PROCEDURE — 83036 HEMOGLOBIN GLYCOSYLATED A1C: CPT

## 2022-06-20 PROCEDURE — 82553 CREATINE MB FRACTION: CPT

## 2022-06-20 PROCEDURE — 93005 ELECTROCARDIOGRAM TRACING: CPT

## 2022-06-20 PROCEDURE — C1894: CPT

## 2022-06-20 PROCEDURE — 93458 L HRT ARTERY/VENTRICLE ANGIO: CPT | Mod: 59

## 2022-06-20 PROCEDURE — 84484 ASSAY OF TROPONIN QUANT: CPT

## 2022-06-20 PROCEDURE — 84100 ASSAY OF PHOSPHORUS: CPT

## 2022-06-20 PROCEDURE — 93306 TTE W/DOPPLER COMPLETE: CPT

## 2022-06-20 PROCEDURE — C1874: CPT

## 2022-06-20 PROCEDURE — 86900 BLOOD TYPING SEROLOGIC ABO: CPT

## 2022-06-20 PROCEDURE — 85610 PROTHROMBIN TIME: CPT

## 2022-06-20 PROCEDURE — 99232 SBSQ HOSP IP/OBS MODERATE 35: CPT

## 2022-06-20 PROCEDURE — 86901 BLOOD TYPING SEROLOGIC RH(D): CPT

## 2022-06-20 PROCEDURE — C9600: CPT | Mod: RC

## 2022-06-20 PROCEDURE — 99153 MOD SED SAME PHYS/QHP EA: CPT

## 2022-06-20 PROCEDURE — 82550 ASSAY OF CK (CPK): CPT

## 2022-06-20 PROCEDURE — 84443 ASSAY THYROID STIM HORMONE: CPT

## 2022-06-20 PROCEDURE — C1769: CPT

## 2022-06-20 PROCEDURE — 99239 HOSP IP/OBS DSCHRG MGMT >30: CPT | Mod: 25

## 2022-06-20 PROCEDURE — 92978 ENDOLUMINL IVUS OCT C 1ST: CPT | Mod: RC

## 2022-06-20 PROCEDURE — C1753: CPT

## 2022-06-20 RX ORDER — TICAGRELOR 90 MG/1
1 TABLET ORAL
Qty: 60 | Refills: 0
Start: 2022-06-20 | End: 2022-07-19

## 2022-06-20 RX ORDER — INSULIN LISPRO 100/ML
8 VIAL (ML) SUBCUTANEOUS ONCE
Refills: 0 | Status: COMPLETED | OUTPATIENT
Start: 2022-06-20 | End: 2022-06-20

## 2022-06-20 RX ORDER — SIMVASTATIN 20 MG/1
1 TABLET, FILM COATED ORAL
Qty: 0 | Refills: 0 | DISCHARGE

## 2022-06-20 RX ORDER — TICAGRELOR 90 MG/1
1 TABLET ORAL
Qty: 60 | Refills: 3
Start: 2022-06-20 | End: 2022-10-17

## 2022-06-20 RX ORDER — POTASSIUM CHLORIDE 20 MEQ
20 PACKET (EA) ORAL ONCE
Refills: 0 | Status: COMPLETED | OUTPATIENT
Start: 2022-06-20 | End: 2022-06-20

## 2022-06-20 RX ORDER — ATORVASTATIN CALCIUM 80 MG/1
1 TABLET, FILM COATED ORAL
Qty: 30 | Refills: 0
Start: 2022-06-20 | End: 2022-07-19

## 2022-06-20 RX ORDER — ASPIRIN/CALCIUM CARB/MAGNESIUM 324 MG
1 TABLET ORAL
Qty: 30 | Refills: 0
Start: 2022-06-20 | End: 2022-07-19

## 2022-06-20 RX ORDER — METOPROLOL TARTRATE 50 MG
1 TABLET ORAL
Qty: 30 | Refills: 0
Start: 2022-06-20 | End: 2022-07-19

## 2022-06-20 RX ADMIN — Medication 81 MILLIGRAM(S): at 12:42

## 2022-06-20 RX ADMIN — Medication 20 MILLIEQUIVALENT(S): at 05:40

## 2022-06-20 RX ADMIN — CHLORHEXIDINE GLUCONATE 1 APPLICATION(S): 213 SOLUTION TOPICAL at 07:00

## 2022-06-20 RX ADMIN — Medication 4: at 12:41

## 2022-06-20 RX ADMIN — Medication 8 UNIT(S): at 12:41

## 2022-06-20 RX ADMIN — HEPARIN SODIUM 5000 UNIT(S): 5000 INJECTION INTRAVENOUS; SUBCUTANEOUS at 05:40

## 2022-06-20 RX ADMIN — Medication 25 MILLIGRAM(S): at 05:40

## 2022-06-20 RX ADMIN — HEPARIN SODIUM 5000 UNIT(S): 5000 INJECTION INTRAVENOUS; SUBCUTANEOUS at 14:08

## 2022-06-20 RX ADMIN — Medication 7 UNIT(S): at 08:32

## 2022-06-20 RX ADMIN — TICAGRELOR 90 MILLIGRAM(S): 90 TABLET ORAL at 05:40

## 2022-06-20 NOTE — PROGRESS NOTE ADULT - ATTENDING COMMENTS
History of type 1 diabetes admitted with inferior wall STEMI s/p PCI  DAPT with ASA, Ticagrelor   Lipitor 80  Hemodynamics acceptable, chest pain free - tolerating beta blocker  TTE with preserved biventricular function  O2 sats mid 90s on room air  Normal renal function  H/H acceptable  COVID negative, no antibiotics   Sugars labile, Endocrine is following; he was switched from outpatient NPH to Lantus with borderline hypoglycemia  Will likely need to decrease Lantus - f/u with Endocrine  No central access History of type 1 diabetes admitted with inferior wall STEMI s/p PCI  DAPT with ASA, Ticagrelor   Lipitor 80  Hemodynamics acceptable, chest pain free - tolerating beta blocker  TTE with preserved biventricular function  O2 sats mid 90s on room air  Normal renal function  H/H acceptable  COVID negative, no antibiotics   Sugars labile, Endocrine is following; he was switched from outpatient NPH to Lantus with borderline hypoglycemia  Will likely need to decrease Lantus - f/u with Endocrine  No central access  OOB/ambulate History of type 1 diabetes admitted with inferior wall STEMI s/p PCI  DAPT with ASA, Ticagrelor   Lipitor 80  Hemodynamics acceptable, chest pain free - tolerating beta blocker  TTE with preserved biventricular function  O2 sats mid 90s on room air  Normal renal function  H/H acceptable  COVID negative, no antibiotics   Sugars labile, Endocrine is following; he was switched from outpatient NPH to Lantus with borderline hypoglycemia  The patient is refusing to continue on this new insulin regimen and wants to be discharged on his outpatient regimen  Will swtich back to his regimen of NPH and premeal insulin; the patient will follow up with his outpatient Endocrinologist  No central access  OOB/ambulate

## 2022-06-20 NOTE — DISCHARGE NOTE PROVIDER - NSDCCPTREATMENT_GEN_ALL_CORE_FT
PRINCIPAL PROCEDURE  Procedure: PCI, with stent insertion  Findings and Treatment: 6/17 PCI via RRA: EVERETT x2 to mRCA 100%.       PRINCIPAL PROCEDURE  Procedure: PCI, with stent insertion  Findings and Treatment: 6/17 PCI via RRA: EVERETT x2 to mRCA 100%.      SECONDARY PROCEDURE  Procedure: Transthoracic echocardiogram  Findings and Treatment: EF 60-65%, normal left ventricular systolic function with basal inferior akinesis

## 2022-06-20 NOTE — DISCHARGE NOTE NURSING/CASE MANAGEMENT/SOCIAL WORK - PATIENT PORTAL LINK FT
You can access the FollowMyHealth Patient Portal offered by [facility] by registering at the following website: [email or web address]. By joining [employer]’s FollowMyHealth portal, you will also be able to view your health information using other applications (apps) compatible with our system.

## 2022-06-20 NOTE — PROGRESS NOTE ADULT - ASSESSMENT
Sheng is hemodynamically stable status post acute ST elevation myocardial infarction with primary intervention including 2 drug-eluting stents to the right coronary artery.  He had some AIVR now without dysrhythmias.  He has no evidence for heart failure.       - No signs of significant ischemia or volume overload.   - cont dapt  - cont statin  - cont toprol 25mg QDay  - bp soft will initiate ACE/ARB as outpt  - Monitor and replete electrolytes. Keep K>4.0 and Mg>2.0.  - Further cardiac workup will depend on clinical course.   - DC planning per primary team. Will fu with us in the office within 2 weeks.

## 2022-06-20 NOTE — DISCHARGE NOTE PROVIDER - NSDCMRMEDTOKEN_GEN_ALL_CORE_FT
aspirin 81 mg oral tablet: 1 tab(s) orally once a day  HumaLOG 100 units/mL subcutaneous solution: 8 unit(s) subcutaneous 3 times a day (before meals)  HumuLIN 70/30 subcutaneous suspension: 27 unit(s) subcutaneous once a day in the AM  HumuLIN 70/30 subcutaneous suspension: 20 unit(s) subcutaneous once a day (at bedtime)  simvastatin 10 mg oral tablet: 1 tab(s) orally once a day (at bedtime)   aspirin 81 mg oral tablet: 1 tab(s) orally once a day  Brilinta (ticagrelor) 90 mg oral tablet: 1 tab(s) orally 2 times a day   HumaLOG 100 units/mL subcutaneous solution: 8 unit(s) subcutaneous 3 times a day (before meals)  HumuLIN 70/30 subcutaneous suspension: 27 unit(s) subcutaneous once a day in the AM  HumuLIN 70/30 subcutaneous suspension: 20 unit(s) subcutaneous once a day (at bedtime)  simvastatin 10 mg oral tablet: 1 tab(s) orally once a day (at bedtime)   aspirin 81 mg oral tablet: 1 tab(s) orally once a day  Brilinta (ticagrelor) 90 mg oral tablet: 1 tab(s) orally 2 times a day   HumaLOG 100 units/mL subcutaneous solution: 8 unit(s) subcutaneous 3 times a day (before meals)  HumuLIN 70/30 subcutaneous suspension: 27 unit(s) subcutaneous once a day in the AM  HumuLIN 70/30 subcutaneous suspension: 20 unit(s) subcutaneous once a day (at bedtime)

## 2022-06-20 NOTE — PROGRESS NOTE ADULT - REASON FOR ADMISSION
Inferior wall STEMI
Yes

## 2022-06-20 NOTE — PROGRESS NOTE ADULT - TIME BILLING
Management of coronary artery disease and diabetes
reviewed labs, imaging, coordination of care
reviewed labs, imaging, counselling, coordination of care

## 2022-06-20 NOTE — PROGRESS NOTE ADULT - SUBJECTIVE AND OBJECTIVE BOX
HPI:  52 year old man with type 1 DM on Humulin BID/Humalog pre-meal, HLD on simvastatin, presented to OSH on 6/17 with severe chest pain in the center of his chest, associated with nausea. 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 Patient admits to heavier than usual drinking last night, does not drink daily, according to wife. He was a previous smoker, quit 6 months ago, smoked for >20 years prior.     EMS found patient with a junctional bradycardia, HR in the 30s, atropine given.  given. Patient brought to [facility].    In [place] ED, patient EKG significant for ST elevations in III, AVF, lead II slight elevation; reciprocal changes in V4-V6, HR normalized to 70s.  Patient given 325 aspirin and 600 Plavix. Patient subsequently received Brilinta 180 as well as heparin bolus. Patient transferred ot [facility] for PCI.    At [facility], patient received 2 stents in RCA for 100% stenosis. During procedure, patient with ectopic AVIR, received lidocaine 50x2. EF 50% noted during procedure.      (17 Jun 2022 16:21)      Overnight events: Tolerating low dose Lopressor. No acute events overnight.       REVIEW OF SYSTEMS:    CONSTITUTIONAL: No weakness, fevers or chills  EYES/ENT: No visual changes;  No vertigo or throat pain   NECK: No pain or stiffness  RESPIRATORY: No cough, wheezing, hemoptysis; No shortness of breath  CARDIOVASCULAR: No chest pain or palpitations  GASTROINTESTINAL: No abdominal or epigastric pain. No nausea, vomiting, or hematemesis; No diarrhea or constipation. No melena or hematochezia.  GENITOURINARY: No dysuria, frequency or hematuria  NEUROLOGICAL: No numbness or weakness  SKIN: No itching, rashes      Physical Exam:  ICU Vital Signs Last 24 Hrs  T(C): 36.9 (20 Jun 2022 05:00), Max: 37.1 (19 Jun 2022 19:00)  T(F): 98.5 (20 Jun 2022 05:00), Max: 98.7 (19 Jun 2022 19:00)  HR: 58 (20 Jun 2022 06:00) (58 - 82)  BP: 106/56 (20 Jun 2022 06:00) (94/51 - 126/67)  BP(mean): 75 (20 Jun 2022 06:00) (65 - 91)  ABP: --  ABP(mean): --  RR: 20 (20 Jun 2022 06:00) (14 - 23)  SpO2: 97% (20 Jun 2022 06:00) (95% - 98%)      Physical Exam:   Constitutional: NAD, well-groomed, well-developed  HEENT:  EOMI, no drainage or redness  Neck: supple,  No JVD  Respiratory: Breath Sounds equal & clear bilaterally to auscultation, no rales/rhonchi/wheezing, no accessory muscle use noted  Cardiovascular: Regular rate, regular rhythm, normal S1, S2; no murmurs or rub  Gastrointestinal: Soft, non-tender, non distended, + bowel sounds  Extremities: BEJARANO x 4, no peripheral edema, no cyanosis, no clubbing   Neurological: A+O x 3; speech clear and intact; no sensory, motor  deficits, normal reflexes  Skin: warm, dry, well perfused      ============================I/O===========================  I&O's Detail    19 Jun 2022 07:01  -  20 Jun 2022 07:00  --------------------------------------------------------  I&O's Summary    19 Jun 2022 07:01  -  20 Jun 2022 07:00  --------------------------------------------------------  IN: 840 mL / OUT: 1200 mL / NET: -360 mL        --------------------------------------------------------    --------------------------------------------------------        ============================ LABS =========================                        11.9   9.27  )-----------( 219      ( 20 Jun 2022 04:41 )             36.3   06-20    141  |  107  |  11  ----------------------------<  70  3.7   |  25  |  0.95    Ca    8.8      20 Jun 2022 04:41  Phos  3.7     06-20  Mg     2.0     06-20    TPro  6.3  /  Alb  3.7  /  TBili  0.4  /  DBili  x   /  AST  52<H>  /  ALT  33  /  AlkPhos  57  06-20       LIVER FUNCTIONS - ( 20 Jun 2022 04:41 )  Alb: 3.7 g/dL / Pro: 6.3 g/dL / ALK PHOS: 57 U/L / ALT: 33 U/L / AST: 52 U/L / GGT: x               PT/INR - ( 18 Jun 2022 04:34 )   PT: 14.0 sec;   INR: 1.20 ratio         PTT - ( 18 Jun 2022 04:34 )  PTT:28.5 sec      ======================Micro/Rad/Cardio=================  Culture: Reviewed   CXR: Reviewed  Echo:Reviewed  ======================================================  PAST MEDICAL & SURGICAL HISTORY:  Diabetes  Type 1 DM      H/O Spinal surgery  2000, compressed nerve            Assessment and Plan:   · Assessment	  ====================ASSESSMENT ==============  52 year old man with diabetes, family history of CAD and CHF, presented with inferior wall STEMI, s/p 2 EVERETT to RCA, transferred to CCU for further management, in the setting of  poorly controlled blood sugars likely secondary to STEMI.      Plan:  ====================== NEUROLOGY=====================  AOx3, no active issues  - neuro checks per protocol  - encourage plenty of ambulation     ==================== RESPIRATORY======================  No active issues spO2 > 92% on RA     ====================CARDIOVASCULAR==================  IWSTEMI  - s/p LHC with EVERETT x2 to 100% RCA. Some intra procedural AIVR with hypotension req. lidocaine  - cont DAPT w/ aspirin and Brilinta  - c/w high intensity statin  - TTE 6/18: EF 60-65%, normal LVSF w/ basal inferior akinesis   -  low dose lopressor transitioned to toprol prior to dc   - Monitor cardiac enzymes for downtrend  - possible DC home today    ===================HEMATOLOGIC/ONC ===================  - heparin subQ for DVT ppx   - CBC stable   - DAPT: aspirin and brilinta        ===================== RENAL =========================  No active issues  - Trend BUN/SCr - currently stable and WNL  - trend BMP daily, monitor Strict Is&Os  - Keep K > 4, Mg > 2    ==================== GASTROINTESTINAL===================  No active issues  - DASH/TLC/Consistent carb diet     +BM 6/19      =======================    ENDOCRINE  =====================  hx of Diabetes, type 1 (A1C 7.7)   - transitioned to lantus 21U qhs /admelog 7U premeal per endo   - monitor FS per protocol   - Endo 6/19: Plan to DC on insulin, basal bolus insulin if pt is amenable versus NPH BID + humalog BID pre-meal which pt currently takes at home if he prefers to remain on this regimen (regimen and doses to be finalized). Outpatient f/u with patient's Endocrinologist.       ========================INFECTIOUS DISEASE================  No active issues  - trend CBC and fever curve, monitor off abx

## 2022-06-20 NOTE — PROVIDER CONTACT NOTE (HYPOGLYCEMIA EVENT) - NS PROVIDER CONTACT BACKGROUND-HYPO
Age: 52y    Gender: Male    POCT Blood Glucose:  89 mg/dL (06-20-22 @ 08:43)  67 mg/dL (06-20-22 @ 08:01)  63 mg/dL (06-19-22 @ 21:05)  55 mg/dL (06-19-22 @ 21:03)  152 mg/dL (06-19-22 @ 17:00)  80 mg/dL (06-19-22 @ 12:30)      eMAR:atorvastatin   80 milliGRAM(s) Oral (06-19-22 @ 21:11)    insulin glargine Injectable (LANTUS)   21 Unit(s) SubCutaneous (06-19-22 @ 21:19)    insulin lispro (ADMELOG) corrective regimen sliding scale   2 Unit(s) SubCutaneous (06-19-22 @ 17:08)    insulin lispro Injectable (ADMELOG)   7 Unit(s) SubCutaneous (06-20-22 @ 08:32)   7 Unit(s) SubCutaneous (06-19-22 @ 17:09)

## 2022-06-20 NOTE — DISCHARGE NOTE PROVIDER - CARE PROVIDER_API CALL
Priyank Cordoba)  EndocrinologyMetabDiabetes; Internal Medicine  [address] [address]  Phone: [phone]  Fax: [phone]  Follow Up Time:     Sadi Irvin)  Cardiovascular Disease  [address] [place] [address]  Phone: [phone]  Fax: [phone]  Follow Up Time:

## 2022-06-20 NOTE — PROGRESS NOTE ADULT - SUBJECTIVE AND OBJECTIVE BOX
[facility] -- Yulisa Ryan, Albaro, Estevan, Roberto Nichole Savella  Office # [phone]      Follow Up:  IWMI    Subjective/Observations: Patient seen and examined. Events noted. Resting comfortably in bed. No complaints of chest pain, dyspnea, or palpitations reported. No signs of orthopnea or PND.       REVIEW OF SYSTEMS: All other review of systems is negative unless indicated above    PAST MEDICAL & SURGICAL HISTORY:  Diabetes  Type 1 DM      H/O Spinal surgery  2000, compressed nerve          MEDICATIONS  (STANDING):  aspirin  chewable 81 milliGRAM(s) Oral daily  atorvastatin 80 milliGRAM(s) Oral at bedtime  chlorhexidine 4% Liquid 1 Application(s) Topical <User Schedule>  heparin   Injectable 5000 Unit(s) SubCutaneous every 8 hours  insulin glargine Injectable (LANTUS) 21 Unit(s) SubCutaneous at bedtime  insulin lispro (ADMELOG) corrective regimen sliding scale   SubCutaneous three times a day before meals  insulin lispro (ADMELOG) corrective regimen sliding scale   SubCutaneous at bedtime  insulin lispro Injectable (ADMELOG) 7 Unit(s) SubCutaneous three times a day before meals  metoprolol succinate ER 25 milliGRAM(s) Oral daily  ticagrelor 90 milliGRAM(s) Oral every 12 hours    MEDICATIONS  (PRN):      Allergies    No Known Allergies    Intolerances            Vital Signs Last 24 Hrs  T(C): 36.9 (20 Jun 2022 07:00), Max: 37.1 (19 Jun 2022 19:00)  T(F): 98.5 (20 Jun 2022 07:00), Max: 98.7 (19 Jun 2022 19:00)  HR: 67 (20 Jun 2022 08:00) (58 - 82)  BP: 108/65 (20 Jun 2022 08:00) (94/51 - 126/67)  BP(mean): 80 (20 Jun 2022 08:00) (65 - 91)  RR: 22 (20 Jun 2022 08:00) (15 - 23)  SpO2: 98% (20 Jun 2022 08:00) (95% - 98%)    I&O's Summary    19 Jun 2022 07:01  -  20 Jun 2022 07:00  --------------------------------------------------------  IN: 840 mL / OUT: 1200 mL / NET: -360 mL          PHYSICAL EXAM:  TELE:   Constitutional: NAD, awake and alert, well-developed  HEENT: Moist Mucous Membranes, Anicteric  Pulmonary: Decreased breath sounds b/l. No rales, crackles or wheeze appreciated.   Cardiovascular: Regular, S1 and S2, No murmurs, rubs, gallops or clicks  Gastrointestinal: Bowel Sounds present, soft, nontender.   Lymph: No peripheral edema. No lymphadenopathy.  Skin: No visible rashes or ulcers.  Psych:  Mood & affect appropriate for situation    LABS: All Labs Reviewed:                        11.9   9.27  )-----------( 219      ( 20 Jun 2022 04:41 )             36.3                         11.5   10.77 )-----------( 214      ( 19 Jun 2022 04:44 )             34.9                         11.8   12.47 )-----------( 212      ( 18 Jun 2022 15:43 )             35.8     20 Jun 2022 04:41    141    |  107    |  11     ----------------------------<  70     3.7     |  25     |  0.95   19 Jun 2022 04:44    139    |  105    |  16     ----------------------------<  103    3.7     |  25     |  0.92   18 Jun 2022 15:43    136    |  104    |  17     ----------------------------<  278    4.2     |  23     |  1.05     Ca    8.8        20 Jun 2022 04:41  Ca    8.5        19 Jun 2022 04:44  Ca    8.5        18 Jun 2022 15:43  Phos  3.7       20 Jun 2022 04:41  Phos  3.0       19 Jun 2022 04:44  Phos  2.1       18 Jun 2022 15:43  Mg     2.0       20 Jun 2022 04:41  Mg     1.9       19 Jun 2022 04:44  Mg     2.2       18 Jun 2022 15:43    TPro  6.3    /  Alb  3.7    /  TBili  0.4    /  DBili  x      /  AST  52     /  ALT  33     /  AlkPhos  57     20 Jun 2022 04:41  TPro  6.0    /  Alb  3.7    /  TBili  0.5    /  DBili  x      /  AST  90     /  ALT  35     /  AlkPhos  54     19 Jun 2022 04:44  TPro  5.9    /  Alb  3.6    /  TBili  0.4    /  DBili  x      /  AST  138    /  ALT  41     /  AlkPhos  55     18 Jun 2022 15:43

## 2022-06-20 NOTE — DISCHARGE NOTE PROVIDER - NSDCCPCAREPLAN_GEN_ALL_CORE_FT
PRINCIPAL DISCHARGE DIAGNOSIS  Diagnosis: STEMI (ST elevation myocardial infarction)  Assessment and Plan of Treatment: YOU HAD A HEART ATTACK. You received 2 stents to your right coronary artery. Follow specified diet and take all medications as prescribed.   Weight management.    No smoking.  Follow up appointments with your doctor(s)  as instructed.      SECONDARY DISCHARGE DIAGNOSES  Diagnosis: Type 1 diabetes  Assessment and Plan of Treatment: HgA1C this admission was 7.7%.   Make sure you get your HgA1c checked every three months.  If you take insulin, check your blood glucose before meals and at bedtime.  It's important not to skip any meals.  Keep a log of your blood glucose results and always take it with you to your doctor appointments.  Keep a list of your current medications including injectables and over the counter medications and bring this medication list with you to all your doctor appointments.  If you have not seen your ophthalmologist this year call for appointment.  Check your feet daily for redness, sores, or openings. Do not self treat. If no improvement in two days call your primary care physician for an appointment.  Low blood sugar (hypoglycemia) is a blood sugar below 70mg/dl. Check your blood sugar if you feel signs/symptoms of hypoglycemia. If your blood sugar is below 70 take 15 grams of carbohydrates (ex 4 oz of apple juice, 3-4 glucose tablets, or 4-6 oz of regular soda) wait 15 minutes and repeat blood sugar to make sure it comes up above 70.  If your blood sugar is above 70 and you are due for a meal, have a meal.  If you are not due for a meal have a snack.  This snack helps keeps your blood sugar at a safe range.

## 2022-06-20 NOTE — DISCHARGE NOTE NURSING/CASE MANAGEMENT/SOCIAL WORK - NSDCPEFALRISK_GEN_ALL_CORE
For information on Fall & Injury Prevention, visit: [email or web address] OR  [email or web address] OR  https://www.cdc.gov/steadi/patient.html

## 2022-06-20 NOTE — DISCHARGE NOTE PROVIDER - NSDCFUADDINST_GEN_ALL_CORE_FT
Continue your medications. Do not stop Aspirin or Brilinta unless instructed by your cardiologist.  No heavy lifting or pushing/pulling or strenuous activity with procedure arm for 2 weeks. No driving for 2 days. No sex for 1 week.  You may shower 24 hours following procedure but no soaking of your wrist in water (such as in a pool, sink, or tub) for 1 week. Check wrist site for bleeding and/or swelling daily following procedure. Call your doctor/cardiologist immediately for bleeding or swelling or if you have increased/persistent pain or drainage at the wrist site or if you have numbness, tingling or blue or white coloring of your hand or fingers.  Follow up with your cardiologist in 1- 2 weeks. You may call [facility] at [phone] or [phone] after office hours and weekends with any questions or concerns following your procedure.

## 2022-06-20 NOTE — DISCHARGE NOTE PROVIDER - HOSPITAL COURSE
52 year old man with type 1 DM on Humulin BID/Humalog pre-meal, HLD on simvastatin, previous smoker, presented to OSH on 6/17 with severe chest pain in the center of his chest, associated with nausea. 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 EMS found patient with a junctional bradycardia, HR in the 30s, atropine given.  given. Patient brought to [facility].  In [place] ED, patient EKG significant for ST elevations in III, AVF, lead II slight elevation; reciprocal changes in V4-V6, HR normalized to 70s.  Patient given 325 aspirin and 600 Plavix. Patient subsequently received Brilinta 180 as well as heparin bolus. Patient transferred ot [facility] for emergent PCI.  At [facility], patient received 2 stents in RCA for 100% stenosis. During procedure, patient with ectopic AVIR, received lidocaine 50x2. EF 50% noted during procedure. Formal echo significant for EF 60-65% and basal inferior wall akinesis. Cardiac enzymes downtrended and patient remained chest pain free post PCI. CICU course relatively uneventful. Endocrine was consulted for T1DM management and recommended transitioning to basal bolus regimen. 52 year old man with type 1 DM on Humulin BID/Humalog pre-meal, HLD on simvastatin, previous smoker, presented to OSH on 6/17 with severe chest pain in the center of his chest, associated with nausea. 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 EMS found patient with a junctional bradycardia, HR in the 30s, atropine given.  given. Patient brought to [facility].  In [place] ED, patient EKG significant for ST elevations in III, AVF, lead II slight elevation; reciprocal changes in V4-V6, HR normalized to 70s.  Patient given 325 aspirin and 600 Plavix. Patient subsequently received Brilinta 180 as well as heparin bolus. Patient transferred ot [facility] for emergent PCI.  At [facility], patient received 2 stents in RCA for 100% stenosis. During procedure, patient with ectopic AVIR, received lidocaine 50x2. EF 50% noted during procedure. Formal echo significant for EF 60-65% and basal inferior wall akinesis. Cardiac enzymes downtrended and patient remained chest pain free post PCI. CICU course relatively uneventful. Tolerating 25mg Toprol. Endocrine was consulted for T1DM management and recommended transitioning to basal bolus regimen. Patient became hypoglycemic on basal/bolus regimen with Lantus, on d/c home to continue current home regimen. 52 year old man with type 1 DM on Humulin BID/Humalog pre-meal, HLD on simvastatin, previous smoker, presented to OSH on 6/17 with severe chest pain in the center of his chest, associated with nausea. 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 EMS found patient with a junctional bradycardia, HR in the 30s, atropine given.  given. Patient brought to [facility].  In [place] ED, patient EKG significant for ST elevations in III, AVF, lead II slight elevation; reciprocal changes in V4-V6, HR normalized to 70s.  Patient given 325 aspirin and 600 Plavix. Patient subsequently received Brilinta 180 as well as heparin bolus. Patient transferred ot [facility] for emergent PCI.  At [facility], patient received 2 stents in RCA for 100% stenosis. During procedure, patient with ectopic AVIR, received lidocaine 50x2. EF 50% noted during procedure. Formal echo significant for EF 60-65% and basal inferior wall akinesis. Cardiac enzymes downtrended and patient remained chest pain free post PCI. CICU course relatively uneventful. Tolerating 25mg Toprol. Endocrine was consulted for T1DM management and recommended transitioning to basal bolus regimen. Patient became hypoglycemic on basal/bolus regimen with Lantus, on d/c home to continue current home regimen.

## 2022-06-22 PROBLEM — E11.9 TYPE 2 DIABETES MELLITUS WITHOUT COMPLICATIONS: Chronic | Status: ACTIVE | Noted: 2022-06-17

## 2022-06-22 PROBLEM — Z00.00 ENCOUNTER FOR PREVENTIVE HEALTH EXAMINATION: Status: ACTIVE | Noted: 2022-06-22

## 2022-06-29 RX ORDER — INSULIN LISPRO 100 [IU]/ML
100 INJECTION, SOLUTION INTRAVENOUS; SUBCUTANEOUS
Refills: 0 | Status: ACTIVE | COMMUNITY

## 2022-06-29 RX ORDER — INSULIN HUMAN 100 [IU]/ML
(70-30) 100 INJECTION, SUSPENSION SUBCUTANEOUS
Refills: 0 | Status: ACTIVE | COMMUNITY

## 2022-07-01 ENCOUNTER — NON-APPOINTMENT (OUTPATIENT)
Age: 52
End: 2022-07-01

## 2022-07-01 ENCOUNTER — APPOINTMENT (OUTPATIENT)
Dept: CARDIOLOGY | Facility: CLINIC | Age: 52
End: 2022-07-01

## 2022-07-01 VITALS
OXYGEN SATURATION: 97 % | HEIGHT: 68 IN | HEART RATE: 56 BPM | WEIGHT: 184 LBS | BODY MASS INDEX: 27.89 KG/M2 | DIASTOLIC BLOOD PRESSURE: 77 MMHG | SYSTOLIC BLOOD PRESSURE: 121 MMHG

## 2022-07-01 DIAGNOSIS — Z87.891 PERSONAL HISTORY OF NICOTINE DEPENDENCE: ICD-10-CM

## 2022-07-01 DIAGNOSIS — Z80.0 FAMILY HISTORY OF MALIGNANT NEOPLASM OF DIGESTIVE ORGANS: ICD-10-CM

## 2022-07-01 DIAGNOSIS — Z82.49 FAMILY HISTORY OF ISCHEMIC HEART DISEASE AND OTHER DISEASES OF THE CIRCULATORY SYSTEM: ICD-10-CM

## 2022-07-01 DIAGNOSIS — Z78.9 OTHER SPECIFIED HEALTH STATUS: ICD-10-CM

## 2022-07-01 DIAGNOSIS — I25.10 ATHEROSCLEROTIC HEART DISEASE OF NATIVE CORONARY ARTERY W/OUT ANGINA PECTORIS: ICD-10-CM

## 2022-07-01 DIAGNOSIS — E11.9 TYPE 2 DIABETES MELLITUS W/OUT COMPLICATIONS: ICD-10-CM

## 2022-07-01 DIAGNOSIS — I25.2 OLD MYOCARDIAL INFARCTION: ICD-10-CM

## 2022-07-01 PROCEDURE — 99214 OFFICE O/P EST MOD 30 MIN: CPT

## 2022-07-01 PROCEDURE — 93000 ELECTROCARDIOGRAM COMPLETE: CPT

## 2022-07-01 RX ORDER — ASPIRIN 81 MG/1
81 TABLET, CHEWABLE ORAL DAILY
Qty: 90 | Refills: 2 | Status: ACTIVE | COMMUNITY
Start: 2022-07-01

## 2022-07-01 RX ORDER — ASPIRIN 81 MG
81 TABLET, DELAYED RELEASE (ENTERIC COATED) ORAL
Refills: 0 | Status: DISCONTINUED | COMMUNITY
End: 2022-07-01

## 2022-07-01 RX ORDER — ATORVASTATIN CALCIUM 80 MG/1
80 TABLET, FILM COATED ORAL
Qty: 90 | Refills: 1 | Status: ACTIVE | COMMUNITY
Start: 2022-06-20

## 2022-07-01 RX ORDER — METOPROLOL SUCCINATE 25 MG/1
25 TABLET, EXTENDED RELEASE ORAL
Qty: 90 | Refills: 2 | Status: ACTIVE | COMMUNITY
Start: 2022-06-20

## 2022-09-20 RX ORDER — TICAGRELOR 90 MG/1
90 TABLET ORAL TWICE DAILY
Qty: 180 | Refills: 2 | Status: ACTIVE | COMMUNITY

## 2022-10-10 ENCOUNTER — APPOINTMENT (OUTPATIENT)
Dept: CARDIOLOGY | Facility: CLINIC | Age: 52
End: 2022-10-10

## 2024-02-27 NOTE — PROGRESS NOTE ADULT - SUBJECTIVE AND OBJECTIVE BOX
8 ml of contrast were injected throughout the case. 92 mL of contrast was the total wasted during the case. 100 mL was the total amount used during the case. BRAD COLLINS  [id number]  Patient is a 52y old  Male who presents with a chief complaint of Inferior wall STEMI (19 Jun 2022 14:30)    HPI:  52 year old man with type 1 DM on Humulin BID/Humalog pre-meal, HLD on simvastatin, presented to OSH on 6/17 with severe chest pain in the center of his chest, associated with nausea. 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 Patient admits to heavier than usual drinking last night, does not drink daily, according to wife. He was a previous smoker, quit 6 months ago, smoked for >20 years prior.     EMS found patient with a junctional bradycardia, HR in the 30s, atropine given.  given. Patient brought to [facility].    In [place] ED, patient EKG significant for ST elevations in III, AVF, lead II slight elevation; reciprocal changes in V4-V6, HR normalized to 70s.  Patient given 325 aspirin and 600 Plavix. Patient subsequently received Brilinta 180 as well as heparin bolus. Patient transferred ot [facility] for PCI.    At [facility], patient received 2 stents in RCA for 100% stenosis. During procedure, patient with ectopic AVIR, received lidocaine 50x2. EF 50% noted during procedure.      (17 Jun 2022 16:21)      Overnight events: Tolerating low dose Lopressor. No acute events overnight.       REVIEW OF SYSTEMS:    CONSTITUTIONAL: No weakness, fevers or chills  EYES/ENT: No visual changes;  No vertigo or throat pain   NECK: No pain or stiffness  RESPIRATORY: No cough, wheezing, hemoptysis; No shortness of breath  CARDIOVASCULAR: No chest pain or palpitations  GASTROINTESTINAL: No abdominal or epigastric pain. No nausea, vomiting, or hematemesis; No diarrhea or constipation. No melena or hematochezia.  GENITOURINARY: No dysuria, frequency or hematuria  NEUROLOGICAL: No numbness or weakness  SKIN: No itching, rashes      Physical Exam:  Vital Signs Last 24 Hrs  T(C): 37.1 (19 Jun 2022 19:00), Max: 37.3 (18 Jun 2022 20:00)  T(F): 98.7 (19 Jun 2022 19:00), Max: 99.2 (18 Jun 2022 20:00)  HR: 67 (19 Jun 2022 19:00) (59 - 82)  BP: 111/57 (19 Jun 2022 19:00) (90/55 - 118/64)  BP(mean): 77 (19 Jun 2022 19:00) (65 - 85)  RR: 18 (19 Jun 2022 19:00) (13 - 23)  SpO2: 97% (19 Jun 2022 19:00) (95% - 97%)    Physical Exam:   Constitutional: NAD, well-groomed, well-developed  HEENT: PERRLA, EOMI, no drainage or redness  Neck: supple,  No JVD  Respiratory: Breath Sounds equal & clear bilaterally to auscultation, no rales/rhonchi/wheezing, no accessory muscle use noted  Cardiovascular: Regular rate, regular rhythm, normal S1, S2; no murmurs or rub  Gastrointestinal: Soft, non-tender, non distended, + bowel sounds  Extremities: BEJARANO x 4, no peripheral edema, no cyanosis, no clubbing   Neurological: A+O x 3; speech clear and intact; no sensory, motor  deficits, normal reflexes  Skin: warm, dry, well perfused      ============================I/O===========================   I&O's Detail    18 Jun 2022 07:01  -  19 Jun 2022 07:00  --------------------------------------------------------  IN:    Oral Fluid: 1200 mL  Total IN: 1200 mL    OUT:    Voided (mL): 375 mL  Total OUT: 375 mL    Total NET: 825 mL      19 Jun 2022 07:01  -  19 Jun 2022 19:52  --------------------------------------------------------  IN:    Oral Fluid: 600 mL  Total IN: 600 mL    OUT:    Voided (mL): 550 mL  Total OUT: 550 mL    Total NET: 50 mL        ============================ LABS =========================                        11.5   10.77 )-----------( 214      ( 19 Jun 2022 04:44 )             34.9     06-19    139  |  105  |  16  ----------------------------<  103<H>  3.7   |  25  |  0.92    Ca    8.5      19 Jun 2022 04:44  Phos  3.0     06-19  Mg     1.9     06-19    TPro  6.0  /  Alb  3.7  /  TBili  0.5  /  DBili  x   /  AST  90<H>  /  ALT  35  /  AlkPhos  54  06-19    LIVER FUNCTIONS - ( 19 Jun 2022 04:44 )  Alb: 3.7 g/dL / Pro: 6.0 g/dL / ALK PHOS: 54 U/L / ALT: 35 U/L / AST: 90 U/L / GGT: x           PT/INR - ( 18 Jun 2022 04:34 )   PT: 14.0 sec;   INR: 1.20 ratio         PTT - ( 18 Jun 2022 04:34 )  PTT:28.5 sec      ======================Micro/Rad/Cardio=================  Culture: Reviewed   CXR: Reviewed  Echo:Reviewed  ======================================================  PAST MEDICAL & SURGICAL HISTORY:  Diabetes  Type 1 DM      H/O Spinal surgery  2000, compressed nerve

## 2024-04-29 NOTE — DISCHARGE NOTE PROVIDER - NSCORESITESY/N_GEN_A_CORE_RD
Addressed by another caregiver:      INTERPRETATION:  Event report received from [facility].  1 tachy event - 40 sec duration TWOS noted in EGM  2 pause events - 6-9 sec duration; undersensing noted in EGM's  Implanted for cryptogenic stroke   No

## 2024-10-16 ENCOUNTER — NON-APPOINTMENT (OUTPATIENT)
Age: 54
End: 2024-10-16

## 2024-10-18 ENCOUNTER — NON-APPOINTMENT (OUTPATIENT)
Age: 54
End: 2024-10-18

## 2024-10-18 ENCOUNTER — APPOINTMENT (OUTPATIENT)
Dept: NEUROLOGY | Facility: CLINIC | Age: 54
End: 2024-10-18

## 2024-10-18 VITALS
DIASTOLIC BLOOD PRESSURE: 85 MMHG | HEART RATE: 67 BPM | OXYGEN SATURATION: 97 % | SYSTOLIC BLOOD PRESSURE: 155 MMHG | HEIGHT: 68 IN | WEIGHT: 183 LBS | BODY MASS INDEX: 27.74 KG/M2

## 2024-10-18 DIAGNOSIS — R20.8 OTHER DISTURBANCES OF SKIN SENSATION: ICD-10-CM

## 2024-10-18 DIAGNOSIS — Z86.39 PERSONAL HISTORY OF OTHER ENDOCRINE, NUTRITIONAL AND METABOLIC DISEASE: ICD-10-CM

## 2024-10-18 DIAGNOSIS — E11.9 TYPE 2 DIABETES MELLITUS W/OUT COMPLICATIONS: ICD-10-CM

## 2024-10-18 PROCEDURE — 99204 OFFICE O/P NEW MOD 45 MIN: CPT

## 2024-10-18 PROCEDURE — G2211 COMPLEX E/M VISIT ADD ON: CPT | Mod: NC

## 2024-10-19 RX ORDER — GABAPENTIN 300 MG/1
300 CAPSULE ORAL
Qty: 90 | Refills: 2 | Status: ACTIVE | COMMUNITY
Start: 2024-10-19 | End: 1900-01-01

## 2024-10-25 ENCOUNTER — LABORATORY RESULT (OUTPATIENT)
Age: 54
End: 2024-10-25

## 2024-10-26 LAB
ALBUMIN SERPL ELPH-MCNC: 4.3 G/DL
ALP BLD-CCNC: 95 U/L
ALT SERPL-CCNC: 38 U/L
ANION GAP SERPL CALC-SCNC: 9 MMOL/L
AST SERPL-CCNC: 25 U/L
BILIRUB SERPL-MCNC: 0.4 MG/DL
BUN SERPL-MCNC: 13 MG/DL
CALCIUM SERPL-MCNC: 10.3 MG/DL
CHLORIDE SERPL-SCNC: 98 MMOL/L
CO2 SERPL-SCNC: 29 MMOL/L
CREAT SERPL-MCNC: 0.84 MG/DL
CRP SERPL-MCNC: <3 MG/L
EGFR: 102 ML/MIN/1.73M2
ERYTHROCYTE [SEDIMENTATION RATE] IN BLOOD BY WESTERGREN METHOD: 5 MM/HR
ESTIMATED AVERAGE GLUCOSE: 189 MG/DL
FOLATE SERPL-MCNC: 15.9 NG/ML
GLUCOSE SERPL-MCNC: 263 MG/DL
HBA1C MFR BLD HPLC: 8.2 %
POTASSIUM SERPL-SCNC: 4.5 MMOL/L
PROT SERPL-MCNC: 7.2 G/DL
SODIUM SERPL-SCNC: 136 MMOL/L
TSH SERPL-ACNC: 1.76 UIU/ML
VIT B12 SERPL-MCNC: 571 PG/ML

## 2024-10-27 LAB
ALBUMIN MFR SERPL ELPH: 59.4 %
ALBUMIN SERPL-MCNC: 4.3 G/DL
ALBUMIN/GLOB SERPL: 1.5 RATIO
ALPHA1 GLOB MFR SERPL ELPH: 3.8 %
ALPHA1 GLOB SERPL ELPH-MCNC: 0.3 G/DL
ALPHA2 GLOB MFR SERPL ELPH: 9.4 %
ALPHA2 GLOB SERPL ELPH-MCNC: 0.7 G/DL
B-GLOBULIN MFR SERPL ELPH: 10 %
B-GLOBULIN SERPL ELPH-MCNC: 0.7 G/DL
GAMMA GLOB FLD ELPH-MCNC: 1.3 G/DL
GAMMA GLOB MFR SERPL ELPH: 17.4 %
INTERPRETATION SERPL IEP-IMP: NORMAL
PROT SERPL-MCNC: 7.2 G/DL
PROT SERPL-MCNC: 7.2 G/DL

## 2024-10-28 ENCOUNTER — NON-APPOINTMENT (OUTPATIENT)
Age: 54
End: 2024-10-28

## 2024-11-19 ENCOUNTER — APPOINTMENT (OUTPATIENT)
Dept: NEUROLOGY | Facility: CLINIC | Age: 54
End: 2024-11-19

## 2024-11-19 ENCOUNTER — TRANSCRIPTION ENCOUNTER (OUTPATIENT)
Age: 54
End: 2024-11-19

## 2024-11-19 VITALS
WEIGHT: 183 LBS | SYSTOLIC BLOOD PRESSURE: 151 MMHG | HEART RATE: 67 BPM | OXYGEN SATURATION: 97 % | DIASTOLIC BLOOD PRESSURE: 83 MMHG | BODY MASS INDEX: 27.74 KG/M2 | HEIGHT: 68 IN

## 2024-11-19 PROCEDURE — 99215 OFFICE O/P EST HI 40 MIN: CPT

## 2024-11-19 PROCEDURE — G2211 COMPLEX E/M VISIT ADD ON: CPT | Mod: NC

## 2024-11-19 RX ORDER — NORTRIPTYLINE HYDROCHLORIDE 10 MG/1
10 CAPSULE ORAL
Qty: 60 | Refills: 1 | Status: ACTIVE | COMMUNITY
Start: 2024-11-19 | End: 1900-01-01

## 2024-11-19 RX ORDER — ATORVASTATIN CALCIUM 80 MG/1
80 TABLET, FILM COATED ORAL
Refills: 0 | Status: ACTIVE | COMMUNITY
Start: 2024-11-19

## 2024-11-26 DIAGNOSIS — R76.8 OTHER SPECIFIED ABNORMAL IMMUNOLOGICAL FINDINGS IN SERUM: ICD-10-CM

## 2024-11-26 DIAGNOSIS — R20.8 OTHER DISTURBANCES OF SKIN SENSATION: ICD-10-CM

## 2024-11-27 LAB
ALBUMIN MFR SERPL ELPH: 59.3 %
ALBUMIN SERPL ELPH-MCNC: 4.4 G/DL
ALBUMIN SERPL-MCNC: 4.4 G/DL
ALBUMIN/GLOB SERPL: 1.5 RATIO
ALP BLD-CCNC: 90 U/L
ALPHA1 GLOB MFR SERPL ELPH: 3.9 %
ALPHA1 GLOB SERPL ELPH-MCNC: 0.3 G/DL
ALPHA2 GLOB MFR SERPL ELPH: 10 %
ALPHA2 GLOB SERPL ELPH-MCNC: 0.7 G/DL
ALT SERPL-CCNC: 28 U/L
ANA PAT FLD IF-IMP: ABNORMAL
ANA SER IF-ACNC: ABNORMAL
ANION GAP SERPL CALC-SCNC: 11 MMOL/L
AST SERPL-CCNC: 22 U/L
B-GLOBULIN MFR SERPL ELPH: 9.8 %
B-GLOBULIN SERPL ELPH-MCNC: 0.7 G/DL
BILIRUB SERPL-MCNC: 0.5 MG/DL
BUN SERPL-MCNC: 10 MG/DL
CALCIUM SERPL-MCNC: 10.1 MG/DL
CHLORIDE SERPL-SCNC: 99 MMOL/L
CO2 SERPL-SCNC: 29 MMOL/L
CREAT SERPL-MCNC: 0.87 MG/DL
DEPRECATED KAPPA LC FREE/LAMBDA SER: 1.7 RATIO
EGFR: 101 ML/MIN/1.73M2
ENA SS-A AB SER IA-ACNC: <0.2 AL
ENA SS-B AB SER IA-ACNC: <0.2 AL
GAMMA GLOB FLD ELPH-MCNC: 1.3 G/DL
GAMMA GLOB MFR SERPL ELPH: 17 %
GLUCOSE SERPL-MCNC: 138 MG/DL
INTERPRETATION SERPL IEP-IMP: NORMAL
KAPPA LC CSF-MCNC: 1.32 MG/DL
KAPPA LC SERPL-MCNC: 2.24 MG/DL
POTASSIUM SERPL-SCNC: 4.3 MMOL/L
PROT SERPL-MCNC: 7.4 G/DL
SODIUM SERPL-SCNC: 138 MMOL/L
TSH SERPL-ACNC: 1.99 UIU/ML

## 2024-11-29 LAB
ALBUPE: 14.9 %
ALPHA1UPE: 30.5 %
ALPHA2UPE: 21.3 %
BENCE JONES EXCRETION: 0 MG/24HR
BETAUPE: 17.9 %
CREAT 24H UR-MCNC: 1.5 G/24 H
CREATININE UR (MAYO): 119 MG/DL
GAMMAUPE: 15.4 %
IGA 24H UR QL IFE: NORMAL
KAPPA LC 24H UR QL: 0 MG/DL
M PROTEIN 24H MFR UR ELPH: 0 %
PROT ?TM UR-MCNC: 24 HR
PROT PATTERN 24H UR ELPH-IMP: NORMAL
PROT UR-MCNC: 7 MG/DL
PROT UR-MCNC: 7 MG/DL
SPECIMEN VOL 24H UR: 1250 ML
U PROTEIN QNT CALCULATION: 88 MG/24 H

## 2024-12-23 ENCOUNTER — OUTPATIENT (OUTPATIENT)
Dept: OUTPATIENT SERVICES | Facility: HOSPITAL | Age: 54
LOS: 1 days | End: 2024-12-23
Payer: COMMERCIAL

## 2024-12-23 ENCOUNTER — APPOINTMENT (OUTPATIENT)
Dept: MRI IMAGING | Facility: CLINIC | Age: 54
End: 2024-12-23

## 2024-12-23 DIAGNOSIS — R20.8 OTHER DISTURBANCES OF SKIN SENSATION: ICD-10-CM

## 2024-12-23 DIAGNOSIS — Z98.890 OTHER SPECIFIED POSTPROCEDURAL STATES: Chronic | ICD-10-CM

## 2024-12-23 DIAGNOSIS — R76.8 OTHER SPECIFIED ABNORMAL IMMUNOLOGICAL FINDINGS IN SERUM: ICD-10-CM

## 2024-12-23 PROCEDURE — 72157 MRI CHEST SPINE W/O & W/DYE: CPT | Mod: 26

## 2025-01-06 ENCOUNTER — OUTPATIENT (OUTPATIENT)
Dept: OUTPATIENT SERVICES | Facility: HOSPITAL | Age: 55
LOS: 1 days | End: 2025-01-06
Payer: COMMERCIAL

## 2025-01-06 ENCOUNTER — APPOINTMENT (OUTPATIENT)
Dept: MRI IMAGING | Facility: CLINIC | Age: 55
End: 2025-01-06

## 2025-01-06 ENCOUNTER — APPOINTMENT (OUTPATIENT)
Dept: MRI IMAGING | Facility: CLINIC | Age: 55
End: 2025-01-06
Payer: COMMERCIAL

## 2025-01-06 DIAGNOSIS — Z00.8 ENCOUNTER FOR OTHER GENERAL EXAMINATION: ICD-10-CM

## 2025-01-06 DIAGNOSIS — Z98.890 OTHER SPECIFIED POSTPROCEDURAL STATES: Chronic | ICD-10-CM

## 2025-01-06 DIAGNOSIS — R20.8 OTHER DISTURBANCES OF SKIN SENSATION: ICD-10-CM

## 2025-01-06 DIAGNOSIS — R76.8 OTHER SPECIFIED ABNORMAL IMMUNOLOGICAL FINDINGS IN SERUM: ICD-10-CM

## 2025-01-06 PROCEDURE — 70553 MRI BRAIN STEM W/O & W/DYE: CPT

## 2025-01-06 PROCEDURE — 70553 MRI BRAIN STEM W/O & W/DYE: CPT | Mod: 26

## 2025-01-06 PROCEDURE — 72156 MRI NECK SPINE W/O & W/DYE: CPT | Mod: 26

## 2025-01-06 PROCEDURE — A9585: CPT

## 2025-01-06 PROCEDURE — 72156 MRI NECK SPINE W/O & W/DYE: CPT

## 2025-01-08 DIAGNOSIS — R20.8 OTHER DISTURBANCES OF SKIN SENSATION: ICD-10-CM

## 2025-01-08 DIAGNOSIS — G95.9 DISEASE OF SPINAL CORD, UNSPECIFIED: ICD-10-CM

## 2025-02-18 ENCOUNTER — APPOINTMENT (OUTPATIENT)
Dept: NEUROLOGY | Facility: CLINIC | Age: 55
End: 2025-02-18